# Patient Record
Sex: FEMALE | Race: BLACK OR AFRICAN AMERICAN | Employment: OTHER | URBAN - METROPOLITAN AREA
[De-identification: names, ages, dates, MRNs, and addresses within clinical notes are randomized per-mention and may not be internally consistent; named-entity substitution may affect disease eponyms.]

---

## 2017-01-18 ENCOUNTER — OFFICE VISIT (OUTPATIENT)
Dept: INTERNAL MEDICINE CLINIC | Age: 67
End: 2017-01-18

## 2017-01-18 VITALS
HEIGHT: 60 IN | BODY MASS INDEX: 30.47 KG/M2 | TEMPERATURE: 98.1 F | RESPIRATION RATE: 18 BRPM | HEART RATE: 72 BPM | OXYGEN SATURATION: 96 % | WEIGHT: 155.2 LBS | SYSTOLIC BLOOD PRESSURE: 100 MMHG | DIASTOLIC BLOOD PRESSURE: 60 MMHG

## 2017-01-18 DIAGNOSIS — J34.89 RHINORRHEA: ICD-10-CM

## 2017-01-18 DIAGNOSIS — G47.30 UNSPECIFIED SLEEP APNEA: ICD-10-CM

## 2017-01-18 DIAGNOSIS — M25.552 LEFT HIP PAIN: ICD-10-CM

## 2017-01-18 DIAGNOSIS — R41.3 MEMORY LOSS: ICD-10-CM

## 2017-01-18 DIAGNOSIS — I10 ESSENTIAL HYPERTENSION: ICD-10-CM

## 2017-01-18 DIAGNOSIS — E78.2 MIXED HYPERLIPIDEMIA: Primary | ICD-10-CM

## 2017-01-18 RX ORDER — IPRATROPIUM BROMIDE 21 UG/1
2 SPRAY, METERED NASAL EVERY 12 HOURS
Qty: 30 ML | Refills: 11 | Status: SHIPPED | OUTPATIENT
Start: 2017-01-18 | End: 2018-01-24 | Stop reason: ALTCHOICE

## 2017-01-18 NOTE — PROGRESS NOTES
HISTORY OF PRESENT ILLNESS  Raquel Castro is a 73 y.o. female. HPI Thais Clark is seen today accompanied by her  Maranda Card for follow up of hypertension and other problems. 1. Essential hypertension. Stable on current regimen. 2. Osteoarthritis, primary, bilateral, involving knees. Symptoms stable. It turns out she is having some hip pain on the left. Will refer for an ortho consultation. 3. Mixed hyperlipidemia. Due for routine labs. 4. Memory loss, obstructive sleep apnea. Up to date with specialist.  She was intolerant of CPAP and is followed clinically at this time. 5. Preventive medicine. A Medicare Wellness Visit in six months. Review of systems notable for left hip pain and rhinorrhea. MedDATA/gwo     Current Outpatient Prescriptions   Medication Sig    ipratropium (ATROVENT) 0.03 % nasal spray 2 Sprays by Both Nostrils route every twelve (12) hours.  nabumetone (RELAFEN) 500 mg tablet Take 1 Tab by mouth two (2) times daily as needed for Pain.  memantine (NAMENDA) 10 mg tablet Take  by mouth two (2) times a day.  hydrochlorothiazide (HYDRODIURIL) 25 mg tablet TAKE 1 TABLET DAILY    simvastatin (ZOCOR) 20 mg tablet TAKE 1 TABLET NIGHTLY    metoprolol (LOPRESSOR) 100 mg tablet TAKE 1 TABLET TWICE A DAY    valsartan (DIOVAN) 80 mg tablet TAKE 1 TABLET DAILY    rivastigmine (EXELON) 9.5 mg/24 hr patch 1 Patch by TransDERmal route daily. Per neurology    KLOR-CON M20 20 mEq tablet TAKE 1 TABLET DAILY    DOCOSAHEXANOIC ACID/EPA (FISH OIL PO) Take  by mouth.  cholecalciferol (VITAMIN D3) 1,000 unit tablet Take  by mouth daily.  garlic 2,894 mg Cap Take 1 Cap by mouth daily. No current facility-administered medications for this visit. Review of Systems   Constitutional: Negative for weight loss. HENT: Positive for congestion. Respiratory: Negative. Cardiovascular: Negative for chest pain, palpitations, leg swelling and PND.    Musculoskeletal: Positive for joint pain. Negative for myalgias. Neurological: Negative for focal weakness. Psychiatric/Behavioral: Positive for memory loss. Physical Exam   Constitutional: She appears well-nourished. Neck: Carotid bruit is not present. Cardiovascular: Normal rate and regular rhythm. Exam reveals no gallop and no friction rub. No murmur heard. Pulmonary/Chest: Effort normal and breath sounds normal. No respiratory distress. Musculoskeletal: She exhibits no edema. Nursing note and vitals reviewed. ASSESSMENT and PLAN  Mariel Marc was seen today for medication evaluation. Diagnoses and all orders for this visit:    Mixed hyperlipidemia  -     LIPID PANEL  -     METABOLIC PANEL, COMPREHENSIVE  -     CBC WITH AUTOMATED DIFF    Memory loss- See neurologist as directed     Unspecified sleep apnea- Follow off treatment     Essential hypertension- Continue current regimen of prescription and / or OTC medications     Left hip pain  -     REFERRAL TO ORTHOPEDICS    Rhinorrhea  -     ipratropium (ATROVENT) 0.03 % nasal spray; 2 Sprays by Both Nostrils route every twelve (12) hours. This has been fully explained to the patient, who indicates understanding.

## 2017-01-18 NOTE — MR AVS SNAPSHOT
Visit Information Date & Time Provider Department Dept. Phone Encounter #  
 1/18/2017 10:20 AM Bassam Alfaro MD [facility] [phone] [id number] Upcoming Health Maintenance Date Due Pneumococcal 65+ Low/Medium Risk (2 of 2 - PCV13) 7/18/2017 MEDICARE YEARLY EXAM 7/19/2017 GLAUCOMA SCREENING Q2Y 2/9/2018 BREAST CANCER SCRN MAMMOGRAM 8/3/2018 DTaP/Tdap/Td series (2 - Td) 6/13/2021 COLONOSCOPY 6/17/2021 Allergies as of 1/18/2017  Review Complete On: 1/18/2017 By: Bassam Alfaro MD  
  
 Severity Noted Reaction Type Reactions Adhesive Tape  08/25/2009    Rash Motrin [Ibuprofen]  08/25/2009    Other (comments) Joint stiffness Percocet [Oxycodone-acetaminophen]  08/25/2009    Hives Current Immunizations  Reviewed on 1/17/2017 Name Date Influenza High Dose Vaccine PF 10/26/2016 Pneumococcal Polysaccharide (PPSV-23) 7/18/2016 TDAP Vaccine 6/13/2011  3:16 PM  
 Varicella Virus Vaccine Live 11/1/2011 Zoster Vaccine, Live 11/1/2011 Not reviewed this visit You Were Diagnosed With   
  
 Codes Comments Mixed hyperlipidemia    -  Primary ICD-10-CM: E34.1 ICD-9-CM: 272.2 Memory loss     ICD-10-CM: R41.3 ICD-9-CM: 780.93 Unspecified sleep apnea     ICD-10-CM: G47.30 ICD-9-CM: 780.57 Essential hypertension     ICD-10-CM: I10 
ICD-9-CM: 401.9 Left hip pain     ICD-10-CM: M25.552 ICD-9-CM: 719.45 Rhinorrhea     ICD-10-CM: J34.89 ICD-9-CM: 478.19 Vitals BP Pulse Temp Resp Height(growth percentile) Weight(growth percentile) 100/60 (BP 1 Location: Right arm, BP Patient Position: Sitting) 72 98.1 °F (36.7 °C) (Oral) 18 5' (1.524 m) 155 lb 3.2 oz (70.4 kg) SpO2 BMI OB Status Smoking Status 96% 30.31 kg/m2 Hysterectomy Never Smoker BMI and BSA Data Body Mass Index Body Surface Area  
 30.31 kg/m 2 1.73 m 2 Preferred Pharmacy Pharmacy Name Phone [address] [place] [address] Jennifer Curtis [phone] Your Updated Medication List  
  
   
This list is accurate as of: 17 11:09 AM.  Always use your most recent med list.  
  
  
  
  
 FISH OIL PO Take  by mouth.  
  
 garlic 0,799 mg Cap Take 1 Cap by mouth daily. hydroCHLOROthiazide 25 mg tablet Commonly known as:  HYDRODIURIL  
TAKE 1 TABLET DAILY  
  
 ipratropium 0.03 % nasal spray Commonly known as:  ATROVENT  
2 Sprays by Both Nostrils route every twelve (12) hours. KLOR-CON M20 20 mEq tablet Generic drug:  potassium chloride TAKE 1 TABLET DAILY  
  
 memantine 10 mg tablet Commonly known as:  Allison Robles Take  by mouth two (2) times a day. metoprolol tartrate 100 mg IR tablet Commonly known as:  LOPRESSOR  
TAKE 1 TABLET TWICE A DAY  
  
 nabumetone 500 mg tablet Commonly known as:  RELAFEN Take 1 Tab by mouth two (2) times daily as needed for Pain.  
  
 rivastigmine 9.5 mg/24 hr patch Commonly known as:  EXELON  
1 Patch by TransDERmal route daily. Per neurology  
  
 simvastatin 20 mg tablet Commonly known as:  ZOCOR  
TAKE 1 TABLET NIGHTLY  
  
 valsartan 80 mg tablet Commonly known as:  DIOVAN  
TAKE 1 TABLET DAILY  
  
 VITAMIN D3 1,000 unit tablet Generic drug:  cholecalciferol Take  by mouth daily. Prescriptions Sent to Pharmacy Refills  
 ipratropium (ATROVENT) 0.03 % nasal spray 11 Si Sprays by Both Nostrils route every twelve (12) hours. Class: Normal  
 Pharmacy: [address] [place] [address] [address]  #: [phone] Route: Both Nostrils We Performed the Following CBC WITH AUTOMATED DIFF [22523 CPT(R)] LIPID PANEL [28884 CPT(R)] METABOLIC PANEL, COMPREHENSIVE [28592 CPT(R)] REFERRAL TO ORTHOPEDICS [FIJ183 Custom] Referral Information Referral ID Referred By Referred To [id number] Andrea ROSEN MD   
   [facility] Phone: [phone] Fax: [phone] Visits Status Start Date End Date 1 New Request 1/18/17 1/18/18 If your referral has a status of pending review or denied, additional information will be sent to support the outcome of this decision. Introducing [facility]! Dear Ashley Sánchez: Thank you for requesting a [employer] account. Our records indicate that you already have an active Going My Way account. You can access your account anytime at [email or web address] Did you know that you can access your hospital and ER discharge instructions at any time in Going My Way? You can also review all of your test results from your hospital stay or ER visit. Additional Information If you have questions, please visit the Frequently Asked Questions section of the Going My Way website at [email or web address] Remember, Going My Way is NOT to be used for urgent needs. For medical emergencies, dial 911. Now available from your iPhone and Android! Please provide this summary of care documentation to your next provider. Your primary care clinician is listed as MAHSA ROSEN. If you have any questions after today's visit, please call [phone].

## 2017-01-31 ENCOUNTER — HOSPITAL ENCOUNTER (OUTPATIENT)
Dept: LAB | Age: 67
Discharge: HOME | End: 2017-01-31
Payer: MEDICARE

## 2017-01-31 PROCEDURE — 36415 COLL VENOUS BLD VENIPUNCTURE: CPT

## 2017-01-31 PROCEDURE — 80053 COMPREHEN METABOLIC PANEL: CPT

## 2017-01-31 PROCEDURE — 85025 COMPLETE CBC W/AUTO DIFF WBC: CPT

## 2017-01-31 PROCEDURE — 80061 LIPID PANEL: CPT

## 2017-02-01 LAB
ALBUMIN SERPL-MCNC: 4.8 G/DL (ref 3.6–4.8)
ALBUMIN/GLOB SERPL: 2.2 {RATIO} (ref 1.1–2.5)
ALP SERPL-CCNC: 61 IU/L (ref 39–117)
ALT SERPL-CCNC: 20 IU/L (ref 0–32)
AST SERPL-CCNC: 27 IU/L (ref 0–40)
BASOPHILS # BLD AUTO: 0 X10E3/UL (ref 0–0.2)
BASOPHILS NFR BLD AUTO: 1 %
BILIRUB SERPL-MCNC: 0.3 MG/DL (ref 0–1.2)
BUN SERPL-MCNC: 31 MG/DL (ref 8–27)
BUN/CREAT SERPL: 20 (ref 11–26)
CALCIUM SERPL-MCNC: 9.7 MG/DL (ref 8.7–10.3)
CHLORIDE SERPL-SCNC: 101 MMOL/L (ref 96–106)
CHOLEST SERPL-MCNC: 214 MG/DL (ref 100–199)
CO2 SERPL-SCNC: 25 MMOL/L (ref 18–29)
CREAT SERPL-MCNC: 1.54 MG/DL (ref 0.57–1)
EOSINOPHIL # BLD AUTO: 0.4 X10E3/UL (ref 0–0.4)
EOSINOPHIL NFR BLD AUTO: 5 %
ERYTHROCYTE [DISTWIDTH] IN BLOOD BY AUTOMATED COUNT: 13.4 % (ref 12.3–15.4)
GLOBULIN SER CALC-MCNC: 2.2 G/DL (ref 1.5–4.5)
GLUCOSE SERPL-MCNC: 87 MG/DL (ref 65–99)
HCT VFR BLD AUTO: 38.3 % (ref 34–46.6)
HDLC SERPL-MCNC: 139 MG/DL
HGB BLD-MCNC: 12.9 G/DL (ref 11.1–15.9)
IMM GRANULOCYTES # BLD: 0 X10E3/UL (ref 0–0.1)
IMM GRANULOCYTES NFR BLD: 0 %
LDLC SERPL CALC-MCNC: 61 MG/DL (ref 0–99)
LYMPHOCYTES # BLD AUTO: 2.5 X10E3/UL (ref 0.7–3.1)
LYMPHOCYTES NFR BLD AUTO: 34 %
MCH RBC QN AUTO: 32.5 PG (ref 26.6–33)
MCHC RBC AUTO-ENTMCNC: 33.7 G/DL (ref 31.5–35.7)
MCV RBC AUTO: 97 FL (ref 79–97)
MONOCYTES # BLD AUTO: 0.7 X10E3/UL (ref 0.1–0.9)
MONOCYTES NFR BLD AUTO: 10 %
NEUTROPHILS # BLD AUTO: 3.7 X10E3/UL (ref 1.4–7)
NEUTROPHILS NFR BLD AUTO: 50 %
PLATELET # BLD AUTO: 164 X10E3/UL (ref 150–379)
POTASSIUM SERPL-SCNC: 4 MMOL/L (ref 3.5–5.2)
PROT SERPL-MCNC: 7 G/DL (ref 6–8.5)
RBC # BLD AUTO: 3.97 X10E6/UL (ref 3.77–5.28)
SODIUM SERPL-SCNC: 142 MMOL/L (ref 134–144)
TRIGL SERPL-MCNC: 70 MG/DL (ref 0–149)
VLDLC SERPL CALC-MCNC: 14 MG/DL (ref 5–40)
WBC # BLD AUTO: 7.3 X10E3/UL (ref 3.4–10.8)

## 2017-02-04 RX ORDER — POTASSIUM CHLORIDE 20 MEQ/1
TABLET, EXTENDED RELEASE ORAL
Qty: 90 TAB | Refills: 3 | Status: SHIPPED | OUTPATIENT
Start: 2017-02-04 | End: 2018-03-29 | Stop reason: SDUPTHER

## 2017-03-25 DIAGNOSIS — E78.2 MIXED HYPERLIPIDEMIA: ICD-10-CM

## 2017-03-26 RX ORDER — SIMVASTATIN 20 MG/1
TABLET, FILM COATED ORAL
Qty: 90 TAB | Refills: 1 | Status: SHIPPED | COMMUNITY
Start: 2017-03-26 | End: 2017-03-27 | Stop reason: SDUPTHER

## 2017-03-27 DIAGNOSIS — E78.2 MIXED HYPERLIPIDEMIA: ICD-10-CM

## 2017-03-27 RX ORDER — SIMVASTATIN 20 MG/1
TABLET, FILM COATED ORAL
Qty: 90 TAB | Refills: 3 | Status: SHIPPED | COMMUNITY
Start: 2017-03-27 | End: 2018-07-09 | Stop reason: SDUPTHER

## 2017-04-24 DIAGNOSIS — I10 ESSENTIAL HYPERTENSION: ICD-10-CM

## 2017-04-24 RX ORDER — HYDROCHLOROTHIAZIDE 25 MG/1
TABLET ORAL
Qty: 90 TAB | Refills: 3 | Status: SHIPPED | COMMUNITY
Start: 2017-04-24 | End: 2017-07-24 | Stop reason: SDUPTHER

## 2017-04-24 RX ORDER — HYDROCHLOROTHIAZIDE 25 MG/1
TABLET ORAL
Qty: 90 TAB | Refills: 3 | Status: SHIPPED | COMMUNITY
Start: 2017-04-24 | End: 2019-12-12 | Stop reason: SDUPTHER

## 2017-06-23 DIAGNOSIS — I10 ESSENTIAL HYPERTENSION: ICD-10-CM

## 2017-06-24 RX ORDER — METOPROLOL TARTRATE 100 MG/1
TABLET ORAL
Qty: 180 TAB | Refills: 1 | Status: SHIPPED | OUTPATIENT
Start: 2017-06-24 | End: 2018-02-21 | Stop reason: SDUPTHER

## 2017-07-24 ENCOUNTER — TELEPHONE (OUTPATIENT)
Dept: INTERNAL MEDICINE CLINIC | Age: 67
End: 2017-07-24

## 2017-07-24 ENCOUNTER — OFFICE VISIT (OUTPATIENT)
Dept: INTERNAL MEDICINE CLINIC | Age: 67
End: 2017-07-24

## 2017-07-24 VITALS
HEART RATE: 64 BPM | SYSTOLIC BLOOD PRESSURE: 130 MMHG | HEIGHT: 60 IN | RESPIRATION RATE: 18 BRPM | TEMPERATURE: 98.2 F | OXYGEN SATURATION: 98 % | DIASTOLIC BLOOD PRESSURE: 72 MMHG | BODY MASS INDEX: 29.29 KG/M2 | WEIGHT: 149.2 LBS

## 2017-07-24 DIAGNOSIS — Z13.31 SCREENING FOR DEPRESSION: ICD-10-CM

## 2017-07-24 DIAGNOSIS — R41.3 MEMORY LOSS: ICD-10-CM

## 2017-07-24 DIAGNOSIS — E78.2 MIXED HYPERLIPIDEMIA: ICD-10-CM

## 2017-07-24 DIAGNOSIS — Z00.00 MEDICARE ANNUAL WELLNESS VISIT, SUBSEQUENT: Primary | ICD-10-CM

## 2017-07-24 DIAGNOSIS — Z23 ENCOUNTER FOR IMMUNIZATION: ICD-10-CM

## 2017-07-24 DIAGNOSIS — I10 ESSENTIAL HYPERTENSION: ICD-10-CM

## 2017-07-24 DIAGNOSIS — Z13.39 SCREENING FOR ALCOHOLISM: ICD-10-CM

## 2017-07-24 DIAGNOSIS — M19.90 ARTHRITIS: ICD-10-CM

## 2017-07-24 NOTE — TELEPHONE ENCOUNTER
Called and spoke to [place] with Dr. Fifi Pineda office- she states pts last colonoscopy was 10/2016 and pt is due to f/u in 10 yrs. She is faxing over a copy of the report.

## 2017-07-24 NOTE — PATIENT INSTRUCTIONS
As discussed in your appointment today, [address] is an important part of planning for your healthcare future. Discussing your preferences with your family and your care team is a part of good healthcare so that we can be guided by your known values and goals. Our office offers this service at no cost to you. Our Nurse Navigators and certified [employer] Cho[employer] ® Facilitators, Krys Fitzpatrick and Sanju Barrios typically schedule family appointments for this service on Wednesdays. To schedule an Advance Care Planning visit or to receive more information about this service, please call [employer] [facility] at [phone] and ask to speak directly to [employer] or [employer]. Medicare Part B Preventive Services Limitations Recommendation Scheduled   Bone Mass Measurement  (age 72 & older, biennial) Requires diagnosis related to osteoporosis or estrogen deficiency. Biennial benefit unless patient has history of long-term glucocorticoid tx or baseline is needed because initial test was by other method     Cardiovascular Screening Blood Tests (every 5 years)  Total cholesterol, HDL, Triglycerides Order as a panel if possible     Colorectal Cancer Screening  -Fecal occult blood test (annual)  -Flexible sigmoidoscopy (5y)  -Screening colonoscopy (10y)  -Barium Enema      Counseling to Prevent Tobacco Use (up to 8 sessions per year)  - Counseling greater than 3 and up to 10 minutes  - Counseling greater than 10 minutes Patients must be asymptomatic of tobacco-related conditions to receive as preventive service     Diabetes Screening Tests (at least every 3 years, Medicare covers annually or at 6-month intervals for prediabetic patients)    Fasting blood sugar (FBS) or glucose tolerance test (GTT) Patient must be diagnosed with one of the following:  -Hypertension, Dyslipidemia, obesity, previous impaired FBS or GTT  Or any two of the following: overweight, FH of diabetes, age ? 72, history of gestational diabetes, birth of baby weighing more than 9 pounds     Diabetes Self-Management Training (DSMT) (no USPSTF recommendation) Requires referral by treating physician for patient with diabetes or renal disease. 10 hours of initial DSMT session of no less than 30 minutes each in a continuous 12-month period. 2 hours of follow-up DSMT in subsequent years. Glaucoma Screening (no USPSTF recommendation) Diabetes mellitus, family history, , age 48 or over,  American, age 72 or over     Human Immunodeficiency Virus (HIV) Screening (annually for increased risk patients)  HIV-1 and HIV-2 by EIA, JANNET, rapid antibody test, or oral mucosa transudate Patient must be at increased risk for HIV infection per USPSTF guidelines or pregnant. Tests covered annually for patients at increased risk. Pregnant patients may receive up to 3 test during pregnancy. Medical Nutrition Therapy (MNT) (for diabetes or renal disease not recommended schedule) Requires referral by treating physician for patient with diabetes or renal disease. Can be provided in same year as diabetes self-management training (DSMT), and CMS recommends medical nutrition therapy take place after DSMT. Up to 3 hours for initial year and 2 hours in subsequent years. Shingles Vaccination A shingles vaccine is also recommended once in a lifetime after age 61     Seasonal Influenza Vaccination (annually)      Pneumococcal Vaccination (once after 72)      Hepatitis B Vaccinations (if medium/high risk) Medium/high risk factors:  End-stage renal disease,  Hemophiliacs who received Factor VIII or IX concentrates, Clients of institutions for the mentally retarded, Persons who live in the same house as a HepB virus carrier, Homosexual men, Illicit injectable drug abusers.      Screening Mammography (biennial age 54-69) Annually (age 36 or over)     Screening Pap Tests and Pelvic Examination (up to age 79 and after 79 if unknown history or abnormal study last 10 years) Every 24 months except high risk     Ultrasound Screening for Abdominal Aortic Aneurysm (AAA) (once) Patient must be referred through IPPE and not have had a screening for abdominal aortic aneurysm before under Medicare.   Limited to patients who meet one of the following criteria:  - Men who are 73-68 years old and have smoked more than 100 cigarettes in their lifetime.  -Anyone with a FH of AAA  -Anyone recommended for screening by USPSTF

## 2017-07-24 NOTE — PROGRESS NOTES
HISTORY OF PRESENT ILLNESS  Maki Rhoades is a 78 y.o. female. HPI Jimmy Rodriguez is seen today for a complete physical examination, Medicare Wellness Visit and follow up of chronic problems. Preventive medicine. Fully reviewed today. She is due for a complete physical examination and routine screening laboratory studies. Also, due for Prevnar vaccine. She is up to date with other vaccinations, gyn care, mammogram, bone density study and a baseline EKG. Medicare Wellness Visit. See attached note. Chronic medical problems are reviewed. 1. Hypertension. Stable on current regimen. 2. Osteoarthritis, multiple locations. Clinically stable. 3. Hyperlipidemia. Due for routine labs. 4. Mild cognitive impairment. Up to date with neurology follow up. No obvious significant progression. Triond Player did not have to contribute to her history in any way. MedDATA/gwo       Current Outpatient Prescriptions   Medication Sig    metoprolol tartrate (LOPRESSOR) 100 mg IR tablet TAKE 1 TABLET BY MOUTH TWICE DAILY    hydroCHLOROthiazide (HYDRODIURIL) 25 mg tablet TAKE 1 TABLET DAILY    simvastatin (ZOCOR) 20 mg tablet TAKE 1 TABLET BY MOUTH NIGHTLY    potassium chloride (K-DUR, KLOR-CON) 20 mEq tablet TAKE 1 TABLET DAILY    ipratropium (ATROVENT) 0.03 % nasal spray 2 Sprays by Both Nostrils route every twelve (12) hours.  nabumetone (RELAFEN) 500 mg tablet Take 1 Tab by mouth two (2) times daily as needed for Pain.  memantine (NAMENDA) 10 mg tablet Take  by mouth two (2) times a day.  valsartan (DIOVAN) 80 mg tablet TAKE 1 TABLET DAILY    rivastigmine (EXELON) 9.5 mg/24 hr patch 1 Patch by TransDERmal route daily. Per neurology    DOCOSAHEXANOIC ACID/EPA (FISH OIL PO) Take  by mouth.  cholecalciferol (VITAMIN D3) 1,000 unit tablet Take  by mouth daily.  garlic 0,209 mg Cap Take 1 Cap by mouth daily. No current facility-administered medications for this visit.           Review of Systems Constitutional: Negative for weight loss. HENT: Negative for hearing loss. Respiratory: Negative. Cardiovascular: Negative for chest pain, palpitations, leg swelling and PND. Gastrointestinal: Negative. Genitourinary: Negative. Musculoskeletal: Positive for joint pain. Negative for myalgias. Neurological: Negative for focal weakness. Psychiatric/Behavioral: Positive for memory loss. Physical Exam   Constitutional: She is oriented to person, place, and time. She appears well-developed and well-nourished. No distress. HENT:   Head: Normocephalic and atraumatic. Right Ear: Tympanic membrane, external ear and ear canal normal.   Left Ear: Tympanic membrane, external ear and ear canal normal.   Eyes: EOM are normal. Pupils are equal, round, and reactive to light. Right eye exhibits no discharge. Left eye exhibits no discharge. Neck: Normal range of motion. Neck supple. Carotid bruit is not present. No thyromegaly present. Cardiovascular: Normal rate, regular rhythm, normal heart sounds and intact distal pulses. Exam reveals no gallop and no friction rub. No murmur heard. Pulmonary/Chest: Effort normal and breath sounds normal. No respiratory distress. She has no wheezes. She has no rales. Abdominal: Soft. Bowel sounds are normal. She exhibits no distension and no mass. There is no tenderness. There is no rebound and no guarding. Musculoskeletal: Normal range of motion. She exhibits no edema or tenderness. Lymphadenopathy:     She has no cervical adenopathy. Neurological: She is alert and oriented to person, place, and time. Reflex Scores:       Patellar reflexes are 1+ on the right side and 1+ on the left side. Skin: Skin is warm and dry. No rash noted. Psychiatric: She has a normal mood and affect. Her behavior is normal.   Nursing note and vitals reviewed. ASSESSMENT and PLAN  Diagnoses and all orders for this visit:    1.  Medicare annual wellness visit, subsequent    2. Encounter for immunization  -     pneumococcal 13 hayes conj dip (PREVNAR-13) 0.5 mL syrg injection; 0.5 mL by IntraMUSCular route once for 1 dose. 3. Essential hypertension  -     COMP METABOLIC PANEL  -     URINALYSIS W/ REFLEX MICRO    4. Arthritis- Continue current regimen of prescription and / or OTC medications     5. Memory loss- See neurologist as directed     6. Mixed hyperlipidemia  -     COMP METABOLIC PANEL  -     CBC W/ AUTOMATED DIFF  -     LIPID PANEL  -     TSH    7. Screening for alcoholism    8. Screening for depression  -     Depression Screen Annual      This is a Subsequent Medicare Annual Wellness Visit providing Personalized Prevention Plan Services (PPPS) (Performed 12 months after initial AWV and PPPS )    I have reviewed the patient's medical history in detail and updated the computerized patient record. History     Past Medical History:   Diagnosis Date    Arthritis     KNEE PAIN    Diverticulosis     Hypercholesteremia     Hypertension     Unspecified sleep apnea     Vitamin B 12 deficiency     Zoster       Past Surgical History:   Procedure Laterality Date    ENDOSCOPY, COLON, DIAGNOSTIC      ? due- 12    HX HYSTERECTOMY      HX ORTHOPAEDIC  '07    RT. SHOULDER    HX ORTHOPAEDIC  6/08    RT. TKR     Current Outpatient Prescriptions   Medication Sig Dispense Refill    metoprolol tartrate (LOPRESSOR) 100 mg IR tablet TAKE 1 TABLET BY MOUTH TWICE DAILY 180 Tab 1    hydroCHLOROthiazide (HYDRODIURIL) 25 mg tablet TAKE 1 TABLET DAILY 90 Tab 3    simvastatin (ZOCOR) 20 mg tablet TAKE 1 TABLET BY MOUTH NIGHTLY 90 Tab 3    potassium chloride (K-DUR, KLOR-CON) 20 mEq tablet TAKE 1 TABLET DAILY 90 Tab 3    ipratropium (ATROVENT) 0.03 % nasal spray 2 Sprays by Both Nostrils route every twelve (12) hours. 30 mL 11    nabumetone (RELAFEN) 500 mg tablet Take 1 Tab by mouth two (2) times daily as needed for Pain.  180 Tab 3    memantine (NAMENDA) 10 mg tablet Take by mouth two (2) times a day.  valsartan (DIOVAN) 80 mg tablet TAKE 1 TABLET DAILY 90 Tab 3    rivastigmine (EXELON) 9.5 mg/24 hr patch 1 Patch by TransDERmal route daily. Per neurology 1 Patch 0    DOCOSAHEXANOIC ACID/EPA (FISH OIL PO) Take  by mouth.  cholecalciferol (VITAMIN D3) 1,000 unit tablet Take  by mouth daily.  garlic 2,928 mg Cap Take 1 Cap by mouth daily. Allergies   Allergen Reactions    Adhesive Tape Rash    Motrin [Ibuprofen] Other (comments)     Joint stiffness    Percocet [Oxycodone-Acetaminophen] Hives     Family History   Problem Relation Age of Onset    Hypertension Mother     Stroke Mother     Diabetes Father     Hypertension Father     Alcohol abuse Neg Hx     Arthritis-osteo Neg Hx     Asthma Neg Hx     Bleeding Prob Neg Hx     Cancer Neg Hx     Elevated Lipids Neg Hx     Headache Neg Hx     Heart Disease Neg Hx     Lung Disease Neg Hx     Migraines Neg Hx     Psychiatric Disorder Neg Hx     Mental Retardation Neg Hx      Social History   Substance Use Topics    Smoking status: Never Smoker    Smokeless tobacco: Never Used    Alcohol use 0.0 oz/week     0 Standard drinks or equivalent per week      Comment: 1 PER DAY rare     Patient Active Problem List   Diagnosis Code    Arthritis M19.90    Hypertension I10    Herpes zoster without mention of complication Q39.4    Diverticulosis of colon (without mention of hemorrhage) K57.30    Memory loss R41.3    Unspecified sleep apnea G47.30    Encounter for long-term (current) use of other medications Z79.899    Mixed hyperlipidemia E78.2    Advance directive discussed with patient Z71.89       Depression Risk Factor Screening:     PHQ over the last two weeks 7/24/2017   Little interest or pleasure in doing things Not at all   Feeling down, depressed or hopeless Not at all   Total Score PHQ 2 0     Alcohol Risk Factor Screening:    On any occasion during the past 3 months, have you had more than 3 drinks containing alcohol? No    Do you average more than 7 drinks per week? No        Functional Ability and Level of Safety:     Hearing Loss   normal-to-mild    Activities of Daily Living   Self-care. Requires assistance with: no ADLs    Fall Risk   Fall Risk Assessment, last 12 mths 7/24/2017   Able to walk? Yes   Fall in past 12 months? No     Abuse Screen   Patient is not abused    Review of Systems   A comprehensive review of systems was negative except for that written in the HPI. Physical Examination     Evaluation of Cognitive Function:  Mood/affect:  neutral  Appearance: age appropriate  Family member/caregiver input:  Re [place] available but doesn't have to give much history    No exam performed today, See attached exam documentation . Patient Care Team:  Sher Vazquez MD as PCP - General    Advice/Referrals/Counseling   Education and counseling provided:  Are appropriate based on today's review and evaluation      Assessment/Plan   Diagnoses and all orders for this visit:    1. Medicare annual wellness visit, subsequent    2. Encounter for immunization  -     pneumococcal 13 hayes conj dip (PREVNAR-13) 0.5 mL syrg injection; 0.5 mL by IntraMUSCular route once for 1 dose. 3. Essential hypertension  -     COMP METABOLIC PANEL  -     URINALYSIS W/ REFLEX MICRO    4. Arthritis    5. Memory loss    6. Mixed hyperlipidemia  -     COMP METABOLIC PANEL  -     CBC W/ AUTOMATED DIFF  -     LIPID PANEL  -     TSH    7. Screening for alcoholism    8.  Screening for depression  -     Depression Screen Annual    .

## 2017-07-24 NOTE — MR AVS SNAPSHOT
Visit Information Date & Time Provider Department Dept. Phone Encounter #  
 7/24/2017  2:00 PM Sara Wyatt, [facility] [phone] [id number] Follow-up Instructions Return in about 6 months (around 1/24/2018). Upcoming Health Maintenance Date Due Pneumococcal 65+ Low/Medium Risk (2 of 2 - PCV13) 7/18/2017 INFLUENZA AGE 9 TO ADULT 8/1/2017 GLAUCOMA SCREENING Q2Y 2/9/2018 MEDICARE YEARLY EXAM 7/25/2018 BREAST CANCER SCRN MAMMOGRAM 8/3/2018 DTaP/Tdap/Td series (2 - Td) 6/13/2021 COLONOSCOPY 6/17/2021 Allergies as of 7/24/2017  Review Complete On: 7/24/2017 By: Sara Wyatt MD  
  
 Severity Noted Reaction Type Reactions Adhesive Tape  08/25/2009    Rash Motrin [Ibuprofen]  08/25/2009    Other (comments) Joint stiffness Percocet [Oxycodone-acetaminophen]  08/25/2009    Hives Current Immunizations  Reviewed on 7/24/2017 Name Date Influenza High Dose Vaccine PF 10/26/2016 Pneumococcal Polysaccharide (PPSV-23) 7/18/2016 TDAP Vaccine 6/13/2011  3:16 PM  
 Varicella Virus Vaccine Live 11/1/2011 Zoster Vaccine, Live 11/1/2011 Reviewed by Sara Wyatt MD on 7/24/2017 at  2:18 PM  
You Were Diagnosed With   
  
 Codes Comments Medicare annual wellness visit, subsequent    -  Primary ICD-10-CM: Z00.00 ICD-9-CM: V70.0 Encounter for immunization     ICD-10-CM: P67 ICD-9-CM: V03.89 Essential hypertension     ICD-10-CM: I10 
ICD-9-CM: 401.9 Arthritis     ICD-10-CM: M19.90 ICD-9-CM: 716.90 Memory loss     ICD-10-CM: R41.3 ICD-9-CM: 780.93 Mixed hyperlipidemia     ICD-10-CM: E78.2 ICD-9-CM: 272.2 Screening for alcoholism     ICD-10-CM: Z13.89 ICD-9-CM: V79.1 Screening for depression     ICD-10-CM: Z13.89 ICD-9-CM: V79.0 Vitals BP Pulse Temp Resp Height(growth percentile) Weight(growth percentile) 130/72 (BP 1 Location: Right arm, BP Patient Position: Sitting) 64 98.2 °F (36.8 °C) (Oral) 18 5' (1.524 m) 149 lb 3.2 oz (67.7 kg) SpO2 BMI OB Status Smoking Status 98% 29.14 kg/m2 Hysterectomy Never Smoker BMI and BSA Data Body Mass Index Body Surface Area  
 29.14 kg/m 2 1.69 m 2 Preferred Pharmacy Pharmacy Name Phone 99 [facility] Jennifer Curtis [phone] Your Updated Medication List  
  
   
This list is accurate as of: 7/24/17  2:30 PM.  Always use your most recent med list.  
  
  
  
  
 FISH OIL PO Take  by mouth.  
  
 garlic 0,412 mg Cap Take 1 Cap by mouth daily. hydroCHLOROthiazide 25 mg tablet Commonly known as:  HYDRODIURIL  
TAKE 1 TABLET DAILY  
  
 ipratropium 0.03 % nasal spray Commonly known as:  ATROVENT  
2 Sprays by Both Nostrils route every twelve (12) hours. memantine 10 mg tablet Commonly known as:  Elsworth Lambert Take  by mouth two (2) times a day. metoprolol tartrate 100 mg IR tablet Commonly known as:  LOPRESSOR  
TAKE 1 TABLET BY MOUTH TWICE DAILY  
  
 nabumetone 500 mg tablet Commonly known as:  RELAFEN Take 1 Tab by mouth two (2) times daily as needed for Pain. pneumococcal 13 hayes conj dip 0.5 mL Syrg injection Commonly known as:  PREVNAR-13  
0.5 mL by IntraMUSCular route once for 1 dose. potassium chloride 20 mEq tablet Commonly known as:  K-DUR, KLOR-CON  
TAKE 1 TABLET DAILY  
  
 rivastigmine 9.5 mg/24 hr patch Commonly known as:  EXELON  
1 Patch by TransDERmal route daily. Per neurology  
  
 simvastatin 20 mg tablet Commonly known as:  ZOCOR  
TAKE 1 TABLET BY MOUTH NIGHTLY  
  
 valsartan 80 mg tablet Commonly known as:  DIOVAN  
TAKE 1 TABLET DAILY  
  
 VITAMIN D3 1,000 unit tablet Generic drug:  cholecalciferol Take  by mouth daily. Prescriptions Printed Refills pneumococcal 13 hayes conj dip (PREVNAR-13) 0.5 mL syrg injection 0 Si.5 mL by IntraMUSCular route once for 1 dose. Class: Print Route: IntraMUSCular We Performed the Following CBC WITH AUTOMATED DIFF [14003 CPT(R)] [place] [address] [[id number] HCPCS] LIPID PANEL [14512 CPT(R)] METABOLIC PANEL, COMPREHENSIVE [77074 CPT(R)] TSH 3RD GENERATION [46553 CPT(R)] URINALYSIS W/ RFLX MICROSCOPIC [83313 CPT(R)] Follow-up Instructions Return in about 6 months (around 2018). To-Do List   
 2017 10:30 AM  
(Arrive by 10:15 AM) Appointment with Jocy WOLFE 1 at [address] [place] [address] ([phone]) Shower or bathe using soap and water. Do not use deodorant, powder, perfumes, or lotion the day of your exam.  If your prior mammograms were not performed at [place] [address] please bring films with you or forward prior images 2 days before your procedure. Check in at registration 15min before your appointment time unless you were instructed to do otherwise. A script is not necessary, but if you have one, please bring it on the day of the mammogram or have it faxed to the department. [facility] [phone] [facility]  [phone] [facility]  [phone] [facility] [phone] [address] [phone] Please arrive 15 minutes prior to appointment to register. Patient Instructions As discussed in your appointment today, [address] is an important part of planning for your healthcare future. Discussing your preferences with your family and your care team is a part of good healthcare so that we can be guided by your known values and goals. Our office offers this service at no cost to you. Our Nurse Navigators and certified Respecting Choices ® Facilitators, Nesha Hurtado and Grisel Anderson typically schedule family appointments for this service on .  To schedule an [address] [place] [address] Road visit or to receive more information about this service, please call [facility] at [phone] and ask to speak directly to [place] Melissa or [employer]. Medicare Part B Preventive Services Limitations Recommendation Scheduled Bone Mass Measurement 
(age 72 & older, biennial) Requires diagnosis related to osteoporosis or estrogen deficiency. Biennial benefit unless patient has history of long-term glucocorticoid tx or baseline is needed because initial test was by other method Cardiovascular Screening Blood Tests (every 5 years) Total cholesterol, HDL, Triglycerides Order as a panel if possible Colorectal Cancer Screening 
-Fecal occult blood test (annual) -Flexible sigmoidoscopy (5y) 
-Screening colonoscopy (10y) -Barium Enema Counseling to Prevent Tobacco Use (up to 8 sessions per year) - Counseling greater than 3 and up to 10 minutes - Counseling greater than 10 minutes Patients must be asymptomatic of tobacco-related conditions to receive as preventive service Diabetes Screening Tests (at least every 3 years, Medicare covers annually or at 6-month intervals for prediabetic patients) Fasting blood sugar (FBS) or glucose tolerance test (GTT) Patient must be diagnosed with one of the following: 
-Hypertension, Dyslipidemia, obesity, previous impaired FBS or GTT 
Or any two of the following: overweight, FH of diabetes, age ? 72, history of gestational diabetes, birth of baby weighing more than 9 pounds Diabetes Self-Management Training (DSMT) (no USPSTF recommendation) Requires referral by treating physician for patient with diabetes or renal disease. 10 hours of initial DSMT session of no less than 30 minutes each in a continuous 12-month period. 2 hours of follow-up DSMT in subsequent years.     
Glaucoma Screening (no USPSTF recommendation) Diabetes mellitus, family history, , age 48 or over,  American, age 72 or over Human Immunodeficiency Virus (HIV) Screening (annually for increased risk patients) HIV-1 and HIV-2 by EIA, JANNET, rapid antibody test, or oral mucosa transudate Patient must be at increased risk for HIV infection per USPSTF guidelines or pregnant. Tests covered annually for patients at increased risk. Pregnant patients may receive up to 3 test during pregnancy. Medical Nutrition Therapy (MNT) (for diabetes or renal disease not recommended schedule) Requires referral by treating physician for patient with diabetes or renal disease. Can be provided in same year as diabetes self-management training (DSMT), and CMS recommends medical nutrition therapy take place after DSMT. Up to 3 hours for initial year and 2 hours in subsequent years. Shingles Vaccination A shingles vaccine is also recommended once in a lifetime after age 61 Seasonal Influenza Vaccination (annually) Pneumococcal Vaccination (once after 65) Hepatitis B Vaccinations (if medium/high risk) Medium/high risk factors:  End-stage renal disease, Hemophiliacs who received Factor VIII or IX concentrates, Clients of institutions for the mentally retarded, Persons who live in the same house as a HepB virus carrier, Homosexual men, Illicit injectable drug abusers. Screening Mammography (biennial age 54-69) Annually (age 36 or over) Screening Pap Tests and Pelvic Examination (up to age 79 and after 79 if unknown history or abnormal study last 10 years) Every 24 months except high risk Ultrasound Screening for Abdominal Aortic Aneurysm (AAA) (once) Patient must be referred through IPPE and not have had a screening for abdominal aortic aneurysm before under Medicare. Limited to patients who meet one of the following criteria: 
- Men who are 73-68 years old and have smoked more than 100 cigarettes in their lifetime. 
-Anyone with a FH of AAA -Anyone recommended for screening by USPSTF Introducing [facility]! Dear Farzana Nugent: Thank you for requesting a Zimride account. Our records indicate that you already have an active Zimride account. You can access your account anytime at [email or web address] Did you know that you can access your hospital and ER discharge instructions at any time in Zimride? You can also review all of your test results from your hospital stay or ER visit. Additional Information If you have questions, please visit the Frequently Asked Questions section of the Zimride website at [email or web address] Remember, Zimride is NOT to be used for urgent needs. For medical emergencies, dial 911. Now available from your iPhone and Android! Please provide this summary of care documentation to your next provider. Your primary care clinician is listed as MAHSA ROSEN. If you have any questions after today's visit, please call [phone].

## 2017-07-26 NOTE — TELEPHONE ENCOUNTER
Advised pt's  that we called Dr. James Ferrell office - pts last colonoscopy was 10/2016. She will be due to f/u in 10 years.

## 2017-07-31 ENCOUNTER — HOSPITAL ENCOUNTER (OUTPATIENT)
Dept: LAB | Age: 67
Discharge: HOME | End: 2017-07-31
Payer: MEDICARE

## 2017-07-31 PROCEDURE — 81001 URINALYSIS AUTO W/SCOPE: CPT

## 2017-07-31 PROCEDURE — 80061 LIPID PANEL: CPT

## 2017-07-31 PROCEDURE — 80053 COMPREHEN METABOLIC PANEL: CPT

## 2017-07-31 PROCEDURE — 36415 COLL VENOUS BLD VENIPUNCTURE: CPT

## 2017-07-31 PROCEDURE — 84443 ASSAY THYROID STIM HORMONE: CPT

## 2017-07-31 PROCEDURE — 85025 COMPLETE CBC W/AUTO DIFF WBC: CPT

## 2017-08-01 LAB
ALBUMIN SERPL-MCNC: 4.4 G/DL (ref 3.6–4.8)
ALBUMIN/GLOB SERPL: 1.8 {RATIO} (ref 1.2–2.2)
ALP SERPL-CCNC: 58 IU/L (ref 39–117)
ALT SERPL-CCNC: 18 IU/L (ref 0–32)
APPEARANCE UR: CLEAR
AST SERPL-CCNC: 24 IU/L (ref 0–40)
BACTERIA #/AREA URNS HPF: NORMAL /[HPF]
BASOPHILS # BLD AUTO: 0 X10E3/UL (ref 0–0.2)
BASOPHILS NFR BLD AUTO: 0 %
BILIRUB SERPL-MCNC: 0.2 MG/DL (ref 0–1.2)
BILIRUB UR QL STRIP: NEGATIVE
BUN SERPL-MCNC: 33 MG/DL (ref 8–27)
BUN/CREAT SERPL: 20 (ref 12–28)
CALCIUM SERPL-MCNC: 9.7 MG/DL (ref 8.7–10.3)
CASTS URNS QL MICRO: NORMAL /LPF
CHLORIDE SERPL-SCNC: 100 MMOL/L (ref 96–106)
CHOLEST SERPL-MCNC: 184 MG/DL (ref 100–199)
CO2 SERPL-SCNC: 24 MMOL/L (ref 18–29)
COLOR UR: YELLOW
CREAT SERPL-MCNC: 1.68 MG/DL (ref 0.57–1)
EOSINOPHIL # BLD AUTO: 0.4 X10E3/UL (ref 0–0.4)
EOSINOPHIL NFR BLD AUTO: 5 %
EPI CELLS #/AREA URNS HPF: NORMAL /HPF
ERYTHROCYTE [DISTWIDTH] IN BLOOD BY AUTOMATED COUNT: 14.3 % (ref 12.3–15.4)
GLOBULIN SER CALC-MCNC: 2.4 G/DL (ref 1.5–4.5)
GLUCOSE SERPL-MCNC: 79 MG/DL (ref 65–99)
GLUCOSE UR QL: NEGATIVE
HCT VFR BLD AUTO: 36.6 % (ref 34–46.6)
HDLC SERPL-MCNC: 108 MG/DL
HGB BLD-MCNC: 12.2 G/DL (ref 11.1–15.9)
HGB UR QL STRIP: NEGATIVE
IMM GRANULOCYTES # BLD: 0 X10E3/UL (ref 0–0.1)
IMM GRANULOCYTES NFR BLD: 0 %
KETONES UR QL STRIP: NEGATIVE
LDLC SERPL CALC-MCNC: 61 MG/DL (ref 0–99)
LEUKOCYTE ESTERASE UR QL STRIP: ABNORMAL
LYMPHOCYTES # BLD AUTO: 2.1 X10E3/UL (ref 0.7–3.1)
LYMPHOCYTES NFR BLD AUTO: 24 %
MCH RBC QN AUTO: 32 PG (ref 26.6–33)
MCHC RBC AUTO-ENTMCNC: 33.3 G/DL (ref 31.5–35.7)
MCV RBC AUTO: 96 FL (ref 79–97)
MICRO URNS: ABNORMAL
MONOCYTES # BLD AUTO: 0.7 X10E3/UL (ref 0.1–0.9)
MONOCYTES NFR BLD AUTO: 8 %
NEUTROPHILS # BLD AUTO: 5.6 X10E3/UL (ref 1.4–7)
NEUTROPHILS NFR BLD AUTO: 63 %
NITRITE UR QL STRIP: NEGATIVE
PH UR STRIP: 6.5 [PH] (ref 5–7.5)
PLATELET # BLD AUTO: 165 X10E3/UL (ref 150–379)
POTASSIUM SERPL-SCNC: 4 MMOL/L (ref 3.5–5.2)
PROT SERPL-MCNC: 6.8 G/DL (ref 6–8.5)
PROT UR QL STRIP: NEGATIVE
RBC # BLD AUTO: 3.81 X10E6/UL (ref 3.77–5.28)
RBC #/AREA URNS HPF: NORMAL /HPF
SODIUM SERPL-SCNC: 143 MMOL/L (ref 134–144)
SP GR UR: 1.01 (ref 1–1.03)
TRIGL SERPL-MCNC: 77 MG/DL (ref 0–149)
TSH SERPL DL<=0.005 MIU/L-ACNC: 0.63 UIU/ML (ref 0.45–4.5)
UROBILINOGEN UR STRIP-MCNC: 0.2 MG/DL (ref 0.2–1)
VLDLC SERPL CALC-MCNC: 15 MG/DL (ref 5–40)
WBC # BLD AUTO: 8.8 X10E3/UL (ref 3.4–10.8)
WBC #/AREA URNS HPF: NORMAL /HPF

## 2017-08-09 ENCOUNTER — HOSPITAL ENCOUNTER (OUTPATIENT)
Dept: MAMMOGRAPHY | Age: 67
Discharge: HOME | End: 2017-08-09
Attending: INTERNAL MEDICINE
Payer: MEDICARE

## 2017-08-09 DIAGNOSIS — M19.90 OSTEOARTHRITIS, UNSPECIFIED OSTEOARTHRITIS TYPE, UNSPECIFIED SITE: ICD-10-CM

## 2017-08-09 DIAGNOSIS — Z12.31 VISIT FOR SCREENING MAMMOGRAM: ICD-10-CM

## 2017-08-09 PROCEDURE — 77067 SCR MAMMO BI INCL CAD: CPT

## 2017-08-09 RX ORDER — NABUMETONE 500 MG/1
TABLET, FILM COATED ORAL
Qty: 180 TAB | Refills: 3 | Status: SHIPPED | COMMUNITY
Start: 2017-08-09 | End: 2018-11-07 | Stop reason: ALTCHOICE

## 2017-08-26 DIAGNOSIS — I10 ESSENTIAL HYPERTENSION: ICD-10-CM

## 2017-08-27 RX ORDER — VALSARTAN 80 MG/1
TABLET ORAL
Qty: 90 TAB | Refills: 1 | Status: SHIPPED | OUTPATIENT
Start: 2017-08-27 | End: 2018-02-02 | Stop reason: SDUPTHER

## 2018-01-24 ENCOUNTER — OFFICE VISIT (OUTPATIENT)
Dept: INTERNAL MEDICINE CLINIC | Age: 68
End: 2018-01-24

## 2018-01-24 VITALS
SYSTOLIC BLOOD PRESSURE: 121 MMHG | OXYGEN SATURATION: 100 % | HEIGHT: 60 IN | TEMPERATURE: 97.8 F | RESPIRATION RATE: 16 BRPM | BODY MASS INDEX: 28.58 KG/M2 | DIASTOLIC BLOOD PRESSURE: 66 MMHG | WEIGHT: 145.6 LBS | HEART RATE: 64 BPM

## 2018-01-24 DIAGNOSIS — E78.2 MIXED HYPERLIPIDEMIA: Primary | ICD-10-CM

## 2018-01-24 DIAGNOSIS — E53.8 B12 DEFICIENCY: ICD-10-CM

## 2018-01-24 DIAGNOSIS — M79.609 PARESTHESIA AND PAIN OF EXTREMITY: ICD-10-CM

## 2018-01-24 DIAGNOSIS — R20.2 PARESTHESIA AND PAIN OF EXTREMITY: ICD-10-CM

## 2018-01-24 DIAGNOSIS — M19.90 OSTEOARTHRITIS, UNSPECIFIED OSTEOARTHRITIS TYPE, UNSPECIFIED SITE: ICD-10-CM

## 2018-01-24 DIAGNOSIS — R41.3 MEMORY LOSS: ICD-10-CM

## 2018-01-24 DIAGNOSIS — I10 ESSENTIAL HYPERTENSION: ICD-10-CM

## 2018-01-24 NOTE — MR AVS SNAPSHOT
[address] [facility] [address] [place] [address] 
[phone] Patient: Aida Hamilton MRN:  [id number] Visit Information Date & Time Provider Department Dept. Phone Encounter #  
 1/24/2018  9:35 AM Raul Osborn MD [facility] [phone] [id number] Follow-up Instructions Return in about 6 months (around 7/24/2018) for mwv. Upcoming Health Maintenance Date Due  
 GLAUCOMA SCREENING Q2Y 2/9/2018 MEDICARE YEARLY EXAM 7/25/2018 BREAST CANCER SCRN MAMMOGRAM 8/9/2019 DTaP/Tdap/Td series (2 - Td) 6/13/2021 COLONOSCOPY 10/4/2026 Allergies as of 1/24/2018  Review Complete On: 1/24/2018 By: Raul Osborn MD  
  
 Severity Noted Reaction Type Reactions Adhesive Tape  08/25/2009    Rash Motrin [Ibuprofen]  08/25/2009    Other (comments) Joint stiffness Percocet [Oxycodone-acetaminophen]  08/25/2009    Hives Current Immunizations  Reviewed on 8/10/2017 Name Date Influenza High Dose Vaccine PF 10/26/2016 Pneumococcal Conjugate (PCV-13) 8/4/2017 Pneumococcal Polysaccharide (PPSV-23) 7/18/2016 TDAP Vaccine 6/13/2011  3:16 PM  
 Varicella Virus Vaccine Live 11/1/2011 Zoster Vaccine, Live 11/1/2011 Not reviewed this visit You Were Diagnosed With   
  
 Codes Comments Mixed hyperlipidemia    -  Primary ICD-10-CM: C57.0 ICD-9-CM: 272.2 Osteoarthritis, unspecified osteoarthritis type, unspecified site     ICD-10-CM: M19.90 ICD-9-CM: 715.90 Memory loss     ICD-10-CM: R41.3 ICD-9-CM: 780.93 Essential hypertension     ICD-10-CM: I10 
ICD-9-CM: 401.9 Paresthesia and pain of extremity     ICD-10-CM: R20.2, M79.609 ICD-9-CM: 782.0, 729.5 B12 deficiency     ICD-10-CM: E53.8 ICD-9-CM: 266.2 Vitals BP Pulse Temp Resp Height(growth percentile) Weight(growth percentile) 121/66 (BP 1 Location: Right arm, BP Patient Position: Sitting) 64 97.8 °F (36.6 °C) (Oral) 16 5' (1.524 m) 145 lb 9.6 oz (66 kg) SpO2 BMI OB Status Smoking Status 100% 28.44 kg/m2 Hysterectomy Never Smoker BMI and BSA Data Body Mass Index Body Surface Area  
 28.44 kg/m 2 1.67 m 2 Preferred Pharmacy Pharmacy Name Phone 99 [facility], [facility] Jennifer Curtis [phone] Your Updated Medication List  
  
   
This list is accurate as of: 1/24/18 10:27 AM.  Always use your most recent med list.  
  
  
  
  
 FISH OIL PO Take  by mouth.  
  
 garlic 4,518 mg Cap Take 1 Cap by mouth daily. hydroCHLOROthiazide 25 mg tablet Commonly known as:  HYDRODIURIL  
TAKE 1 TABLET DAILY  
  
 memantine 10 mg tablet Commonly known as:  Elias Reno Take  by mouth two (2) times a day. metoprolol tartrate 100 mg IR tablet Commonly known as:  LOPRESSOR  
TAKE 1 TABLET BY MOUTH TWICE DAILY  
  
 nabumetone 500 mg tablet Commonly known as:  RELAFEN  
TAKE 1 TABLET BY MOUTH TWICE DAILY AS NEEDED FOR PAIN  
  
 potassium chloride 20 mEq tablet Commonly known as:  K-DUR, KLOR-CON  
TAKE 1 TABLET DAILY  
  
 rivastigmine 9.5 mg/24 hr patch Commonly known as:  EXELON  
1 Patch by TransDERmal route daily. Per neurology  
  
 simvastatin 20 mg tablet Commonly known as:  ZOCOR  
TAKE 1 TABLET BY MOUTH NIGHTLY  
  
 valsartan 80 mg tablet Commonly known as:  DIOVAN  
TAKE 1 TABLET BY MOUTH DAILY  
  
 VITAMIN D3 1,000 unit tablet Generic drug:  cholecalciferol Take  by mouth daily. We Performed the Following CBC WITH AUTOMATED DIFF [10929 CPT(R)] LIPID PANEL [37947 CPT(R)] METABOLIC PANEL, COMPREHENSIVE [31155 CPT(R)] TSH 3RD GENERATION [97699 CPT(R)] URINALYSIS W/ RFLX MICROSCOPIC [48894 CPT(R)] VITAMIN B12 [id number] CPT(R)] VITAMIN B12 [id number] CPT(R)] Follow-up Instructions Return in about 6 months (around 7/24/2018) for mwv. Introducing hospitals & HEALTH SERVICES! Dear Gibran Bagley: Thank you for requesting a Months Of Me account. Our records indicate that you already have an active Months Of Me account. You can access your account anytime at [email or web address]. [email or web address] Did you know that you can access your hospital and ER discharge instructions at any time in Months Of Me? You can also review all of your test results from your hospital stay or ER visit. Additional Information If you have questions, please visit the Frequently Asked Questions section of the Months Of Me website at [email or web address] Remember, Months Of Me is NOT to be used for urgent needs. For medical emergencies, dial 911. Now available from your iPhone and Android! Please provide this summary of care documentation to your next provider. Your primary care clinician is listed as MAHSA ROSEN. If you have any questions after today's visit, please call [phone].

## 2018-01-24 NOTE — PROGRESS NOTES
HISTORY OF PRESENT ILLNESS  Mandy Smith is a 78 y.o. female. JOLLY Tilley is seen today for follow up of hypertension and other problems. Her  Karly Lazcano accompanies and provides some of the history. 1. Hypertension. Stable on current regimen. 2. Hyperlipidemia. Due for routine lab recheck. 3. DJD. Stable with Relafen, no side effect noted   4. Dementia. Supportive care. She is generally very functional and I would classify her more as a mild cognitive impairment. 5. Preventive medicine. A Medicare Wellness Visit in six months. Review of systems notable for upper and lower extremity paresthesias. This is bilateral and present for about two months. She denies any weakness. Reviewed with them checking B-12 and a TSH if not done recently as well as discussing this with their neurologist at the follow up with him. MedDATA/gwo     Current Outpatient Prescriptions   Medication Sig    valsartan (DIOVAN) 80 mg tablet TAKE 1 TABLET BY MOUTH DAILY    nabumetone (RELAFEN) 500 mg tablet TAKE 1 TABLET BY MOUTH TWICE DAILY AS NEEDED FOR PAIN    metoprolol tartrate (LOPRESSOR) 100 mg IR tablet TAKE 1 TABLET BY MOUTH TWICE DAILY    hydroCHLOROthiazide (HYDRODIURIL) 25 mg tablet TAKE 1 TABLET DAILY    simvastatin (ZOCOR) 20 mg tablet TAKE 1 TABLET BY MOUTH NIGHTLY    potassium chloride (K-DUR, KLOR-CON) 20 mEq tablet TAKE 1 TABLET DAILY    memantine (NAMENDA) 10 mg tablet Take  by mouth two (2) times a day.  rivastigmine (EXELON) 9.5 mg/24 hr patch 1 Patch by TransDERmal route daily. Per neurology    DOCOSAHEXANOIC ACID/EPA (FISH OIL PO) Take  by mouth.  cholecalciferol (VITAMIN D3) 1,000 unit tablet Take  by mouth daily.  garlic 0,706 mg Cap Take 1 Cap by mouth daily. No current facility-administered medications for this visit. Review of Systems   Constitutional: Negative for weight loss. Respiratory: Negative.     Cardiovascular: Negative for chest pain, palpitations, leg swelling and PND. Musculoskeletal: Negative for myalgias. Neurological: Positive for tingling and sensory change. Negative for focal weakness. Psychiatric/Behavioral: Positive for memory loss. Physical Exam   Constitutional: She appears well-nourished. Neck: Carotid bruit is not present. Cardiovascular: Normal rate, regular rhythm and intact distal pulses. Exam reveals no gallop and no friction rub. No murmur heard. Pulmonary/Chest: Effort normal and breath sounds normal. No respiratory distress. Musculoskeletal: She exhibits no edema. Nursing note and vitals reviewed. ASSESSMENT and PLAN  Diagnoses and all orders for this visit:    1. Mixed hyperlipidemia  -     METABOLIC PANEL, COMPREHENSIVE  -     CBC WITH AUTOMATED DIFF  -     LIPID PANEL    2. Osteoarthritis, unspecified osteoarthritis type, unspecified site- Continue current regimen of prescription and / or OTC medications     3. Memory loss- Continue current regimen of prescription and / or OTC medications , See neurologist as directed     4. Essential hypertension  -     URINALYSIS W/ RFLX MICROSCOPIC    5. Paresthesia and pain of extremity  -     TSH 3RD GENERATION    6.  B12 deficiency  -     VITAMIN B12  -     VITAMIN B12    Plan was reviewed with patient and family, understanding expressed

## 2018-02-02 DIAGNOSIS — I10 ESSENTIAL HYPERTENSION: ICD-10-CM

## 2018-02-03 RX ORDER — VALSARTAN 80 MG/1
TABLET ORAL
Qty: 90 TAB | Refills: 0 | Status: SHIPPED | OUTPATIENT
Start: 2018-02-03 | End: 2018-04-27 | Stop reason: SDUPTHER

## 2018-02-19 ENCOUNTER — PATIENT MESSAGE (OUTPATIENT)
Dept: INTERNAL MEDICINE CLINIC | Age: 68
End: 2018-02-19

## 2018-02-20 ENCOUNTER — HOSPITAL ENCOUNTER (OUTPATIENT)
Dept: LAB | Age: 68
Discharge: HOME | End: 2018-02-20
Payer: MEDICARE

## 2018-02-20 PROCEDURE — 81001 URINALYSIS AUTO W/SCOPE: CPT

## 2018-02-20 PROCEDURE — 85025 COMPLETE CBC W/AUTO DIFF WBC: CPT

## 2018-02-20 PROCEDURE — 84443 ASSAY THYROID STIM HORMONE: CPT

## 2018-02-20 PROCEDURE — 36415 COLL VENOUS BLD VENIPUNCTURE: CPT

## 2018-02-20 PROCEDURE — 80053 COMPREHEN METABOLIC PANEL: CPT

## 2018-02-20 PROCEDURE — 82607 VITAMIN B-12: CPT

## 2018-02-20 PROCEDURE — 80061 LIPID PANEL: CPT

## 2018-02-21 DIAGNOSIS — I10 ESSENTIAL HYPERTENSION: ICD-10-CM

## 2018-02-21 LAB
ALBUMIN SERPL-MCNC: 4.4 G/DL (ref 3.6–4.8)
ALBUMIN/GLOB SERPL: 1.8 {RATIO} (ref 1.2–2.2)
ALP SERPL-CCNC: 60 IU/L (ref 39–117)
ALT SERPL-CCNC: 16 IU/L (ref 0–32)
APPEARANCE UR: CLEAR
AST SERPL-CCNC: 23 IU/L (ref 0–40)
BACTERIA #/AREA URNS HPF: NORMAL /[HPF]
BASOPHILS # BLD AUTO: 0 X10E3/UL (ref 0–0.2)
BASOPHILS NFR BLD AUTO: 0 %
BILIRUB SERPL-MCNC: 0.2 MG/DL (ref 0–1.2)
BILIRUB UR QL STRIP: NEGATIVE
BUN SERPL-MCNC: 46 MG/DL (ref 8–27)
BUN/CREAT SERPL: 21 (ref 12–28)
CALCIUM SERPL-MCNC: 9.5 MG/DL (ref 8.7–10.3)
CASTS URNS QL MICRO: NORMAL /LPF
CHLORIDE SERPL-SCNC: 101 MMOL/L (ref 96–106)
CHOLEST SERPL-MCNC: 196 MG/DL (ref 100–199)
CO2 SERPL-SCNC: 25 MMOL/L (ref 18–29)
COLOR UR: YELLOW
CREAT SERPL-MCNC: 2.17 MG/DL (ref 0.57–1)
EOSINOPHIL # BLD AUTO: 0.7 X10E3/UL (ref 0–0.4)
EOSINOPHIL NFR BLD AUTO: 7 %
EPI CELLS #/AREA URNS HPF: NORMAL /HPF
ERYTHROCYTE [DISTWIDTH] IN BLOOD BY AUTOMATED COUNT: 13.7 % (ref 12.3–15.4)
GFR SERPLBLD CREATININE-BSD FMLA CKD-EPI: 23 ML/MIN/{1.73_M2}
GFR SERPLBLD CREATININE-BSD FMLA CKD-EPI: 27 ML/MIN/{1.73_M2}
GLOBULIN SER CALC-MCNC: 2.4 G/L (ref 1.5–4.5)
GLUCOSE SERPL-MCNC: 79 MG/DL (ref 65–99)
GLUCOSE UR QL: NEGATIVE
HCT VFR BLD AUTO: 35.7 % (ref 34–46.6)
HDLC SERPL-MCNC: 111 MG/DL
HGB BLD-MCNC: 11.7 G/DL (ref 11.1–15.9)
HGB UR QL STRIP: NEGATIVE
IMM GRANULOCYTES # BLD: 0 X10E3/UL (ref 0–0.1)
IMM GRANULOCYTES NFR BLD: 0 %
KETONES UR QL STRIP: NEGATIVE
LDLC SERPL CALC-MCNC: 72 MG/DL (ref 0–99)
LEUKOCYTE ESTERASE UR QL STRIP: ABNORMAL
LYMPHOCYTES # BLD AUTO: 2.5 X10E3/UL (ref 0.7–3.1)
LYMPHOCYTES NFR BLD AUTO: 26 %
MCH RBC QN AUTO: 32.2 PG (ref 26.6–33)
MCHC RBC AUTO-ENTMCNC: 32.8 G/DL (ref 31.5–35.7)
MCV RBC AUTO: 98 FL (ref 79–97)
MICRO URNS: ABNORMAL
MONOCYTES # BLD AUTO: 0.7 X10E3/UL (ref 0.1–0.9)
MONOCYTES NFR BLD AUTO: 8 %
NEUTROPHILS # BLD AUTO: 5.5 X10E3/UL (ref 1.4–7)
NEUTROPHILS NFR BLD AUTO: 59 %
NITRITE UR QL STRIP: NEGATIVE
PH UR STRIP: 6.5 [PH] (ref 5–7.5)
PLATELET # BLD AUTO: 170 X10E3/UL (ref 150–379)
POTASSIUM SERPL-SCNC: 4 MMOL/L (ref 3.5–5.2)
PROT SERPL-MCNC: 6.8 G/DL (ref 6–8.5)
PROT UR QL STRIP: NEGATIVE
RBC # BLD AUTO: 3.63 X10E6/UL (ref 3.77–5.28)
RBC #/AREA URNS HPF: NORMAL /HPF
SODIUM SERPL-SCNC: 145 MMOL/L (ref 134–144)
SP GR UR: 1.01 (ref 1–1.03)
TRIGL SERPL-MCNC: 65 MG/DL (ref 0–149)
TSH SERPL DL<=0.005 MIU/L-ACNC: 1.07 UIU/ML (ref 0.45–4.5)
UROBILINOGEN UR STRIP-MCNC: 0.2 MG/DL (ref 0.2–1)
VIT B12 SERPL-MCNC: 306 PG/ML (ref 232–1245)
VLDLC SERPL CALC-MCNC: 13 MG/DL (ref 5–40)
WBC # BLD AUTO: 9.4 X10E3/UL (ref 3.4–10.8)
WBC #/AREA URNS HPF: NORMAL /HPF

## 2018-02-22 RX ORDER — METOPROLOL TARTRATE 100 MG/1
TABLET ORAL
Qty: 180 TAB | Refills: 1 | Status: SHIPPED | COMMUNITY
Start: 2018-02-22 | End: 2018-08-22 | Stop reason: SDUPTHER

## 2018-02-27 ENCOUNTER — TELEPHONE (OUTPATIENT)
Dept: INTERNAL MEDICINE CLINIC | Age: 68
End: 2018-02-27

## 2018-02-28 DIAGNOSIS — R79.82 ELEVATED C-REACTIVE PROTEIN: ICD-10-CM

## 2018-02-28 DIAGNOSIS — E53.8 B12 DEFICIENCY: Primary | ICD-10-CM

## 2018-02-28 NOTE — TELEPHONE ENCOUNTER
Advised pt's  pt's kidney function has worsened - cr 2.17. She needs to stop the NSAID nabumetone for 2 weeks - repeat bmp- fasting not required. Also her b 12 is borderline. Additional test methylmalonic acid will see if she needs treatment. Do this in 2 wks as well. He has requested to mail lab slip - done.

## 2018-03-09 ENCOUNTER — HOSPITAL ENCOUNTER (OUTPATIENT)
Dept: LAB | Age: 68
Discharge: HOME | End: 2018-03-09
Payer: MEDICARE

## 2018-03-09 PROCEDURE — 36415 COLL VENOUS BLD VENIPUNCTURE: CPT

## 2018-03-09 PROCEDURE — 83921 ORGANIC ACID SINGLE QUANT: CPT

## 2018-03-09 PROCEDURE — 80048 BASIC METABOLIC PNL TOTAL CA: CPT

## 2018-03-13 LAB
BUN SERPL-MCNC: 49 MG/DL (ref 8–27)
BUN/CREAT SERPL: 23 (ref 12–28)
CALCIUM SERPL-MCNC: 9.8 MG/DL (ref 8.7–10.3)
CHLORIDE SERPL-SCNC: 100 MMOL/L (ref 96–106)
CO2 SERPL-SCNC: 25 MMOL/L (ref 18–29)
CREAT SERPL-MCNC: 2.15 MG/DL (ref 0.57–1)
GFR SERPLBLD CREATININE-BSD FMLA CKD-EPI: 23 ML/MIN/1.73
GFR SERPLBLD CREATININE-BSD FMLA CKD-EPI: 27 ML/MIN/1.73
GLUCOSE SERPL-MCNC: 79 MG/DL (ref 65–99)
METHYLMALONATE SERPL-SCNC: 473 NMOL/L (ref 0–378)
POTASSIUM SERPL-SCNC: 4.2 MMOL/L (ref 3.5–5.2)
SODIUM SERPL-SCNC: 144 MMOL/L (ref 134–144)

## 2018-03-19 ENCOUNTER — TELEPHONE (OUTPATIENT)
Dept: INTERNAL MEDICINE CLINIC | Age: 68
End: 2018-03-19

## 2018-03-19 DIAGNOSIS — N18.30 ACUTE WORSENING OF STAGE 3 CHRONIC KIDNEY DISEASE (HCC): Primary | ICD-10-CM

## 2018-03-19 DIAGNOSIS — R94.4 ABNORMAL KIDNEY FUNCTION STUDY: ICD-10-CM

## 2018-03-19 NOTE — TELEPHONE ENCOUNTER
Advised pt's  pt scheduled at [facility] on 3/23/18 at 11 am. He is aware of appt location and to arrive by 10:45 am.

## 2018-03-19 NOTE — TELEPHONE ENCOUNTER
Advised pt's  Dereck Albarado (on HIPPA) - pt's kidney function the same - worse compared to baseline. MD wants to check renal ultrasound based on results - she will need specialist referral. Also special testing shows b 12 deficiency - start OTC b 12 1000 mcg every day. He has asked to schedule US for this Friday 3/23/18. I will call back with time.

## 2018-03-23 ENCOUNTER — HOSPITAL ENCOUNTER (OUTPATIENT)
Dept: ULTRASOUND IMAGING | Age: 68
Discharge: HOME | End: 2018-03-23
Attending: INTERNAL MEDICINE
Payer: MEDICARE

## 2018-03-23 DIAGNOSIS — R94.4 ABNORMAL KIDNEY FUNCTION STUDY: ICD-10-CM

## 2018-03-23 DIAGNOSIS — N18.30 ACUTE WORSENING OF STAGE 3 CHRONIC KIDNEY DISEASE (HCC): ICD-10-CM

## 2018-03-23 PROCEDURE — 76770 US EXAM ABDO BACK WALL COMP: CPT

## 2018-03-24 NOTE — PROGRESS NOTES
Call - the US is fine except for slightly small right kidney. See nephrologist as directed.  Schedule consult with Dr Eriberto Shaw in [place]

## 2018-03-27 ENCOUNTER — TELEPHONE (OUTPATIENT)
Dept: INTERNAL MEDICINE CLINIC | Age: 68
End: 2018-03-27

## 2018-03-27 DIAGNOSIS — N18.30 STAGE 3 CHRONIC KIDNEY DISEASE (HCC): Primary | ICD-10-CM

## 2018-03-27 NOTE — PROGRESS NOTES
Advised pt's  - the US is fine except for slightly small right kidney. See nephrologist as directed. Schedule consult with Dr Victorino Mendosa in [place]. Called Dr Leeann Giron office - spoke with Danna Maurer - she states we need to send last 3 office notes, last 3 labs results, US report and demographics. Once received and reviewed they will call pt to schedule appt. Faxed all what was requested to [phone]. Confirmation received. Pt's  is aware.

## 2018-03-30 RX ORDER — POTASSIUM CHLORIDE 20 MEQ/1
TABLET, EXTENDED RELEASE ORAL
Qty: 90 TAB | Refills: 3 | Status: SHIPPED | OUTPATIENT
Start: 2018-03-30 | End: 2019-04-10 | Stop reason: SDUPTHER

## 2018-04-11 ENCOUNTER — TELEPHONE (OUTPATIENT)
Dept: INTERNAL MEDICINE CLINIC | Age: 68
End: 2018-04-11

## 2018-04-12 NOTE — TELEPHONE ENCOUNTER
Spoke with pt's  to give names of podiatrists. Asked him what was going on with her feet to request this. He states recently noticed pt right leg and foot swollen - her varicose veins are swollen also - she states are painful. He states pt was seen several years ago at vein center in [place], [place] for treatment. Advised him pt may need to see different specialist than a podiatrist. Will forward to MD. Antonio Joy in [place], [place] ([phone]) - spoke with Jennie Wolff - states pt first seen back in 2/2015 - had laser treatment on both legs. Last visit was 6/2016. Requested pt's medical records. Forward to medical records - spoke with Arnel Naik - she will fax records today.

## 2018-04-13 NOTE — TELEPHONE ENCOUNTER
Spoke with pt's  - advised MD recommends appt with him first, then likely referral back to vein center or another specialist. He states pt was complaining of severe foot pain that he took her to [facility] in [facility] last evening. States she saw Dr Aden Whalen done - no fractures. Labs done - pending. Discharged home with dx possible gout right foot on 2 meds - he was not sure what they are - picking up from [facility] today. She has a fuv there on Tuesday 4/17/18. Advised him I will call to get her records from visit for MD to review. Also asked him to call us on Monday to update us on how she is doing. Will forward to MD.    Called [facility] [phone] - spoke with Андрей Salinas requesting pt's records from yesterday's appt. Received records - on MD's desk to review. The 2 meds given were Diclofenac Potassium 50 mg one tab tid x 10 days and Prednisone 10 mg one tab qid x 5 days.

## 2018-04-16 ENCOUNTER — TELEPHONE (OUTPATIENT)
Dept: INTERNAL MEDICINE CLINIC | Age: 68
End: 2018-04-16

## 2018-04-16 NOTE — TELEPHONE ENCOUNTER
Spoke with pt's  Myriam Nava - he wanted Dr Lester Trevino to know that [facility] called them today with lab results. Pt's uric acid is high. Pt started on Allopurinol 100 mg daily. Pt has fuv tomorrow with them. Asked if he would have them send note from visit tomorrow's visit and lab results.  Will forward to MD.

## 2018-04-27 DIAGNOSIS — I10 ESSENTIAL HYPERTENSION: ICD-10-CM

## 2018-04-27 RX ORDER — VALSARTAN 80 MG/1
TABLET ORAL
Qty: 90 TAB | Refills: 3 | Status: SHIPPED | OUTPATIENT
Start: 2018-04-27 | End: 2018-07-20

## 2018-06-11 RX ORDER — HYDROCHLOROTHIAZIDE 25 MG/1
TABLET ORAL
Qty: 90 TAB | Refills: 1 | Status: SHIPPED | COMMUNITY
Start: 2018-06-11 | End: 2018-11-07 | Stop reason: SDUPTHER

## 2018-06-21 ENCOUNTER — OFFICE VISIT (OUTPATIENT)
Dept: INTERNAL MEDICINE CLINIC | Age: 68
End: 2018-06-21

## 2018-06-21 VITALS
RESPIRATION RATE: 16 BRPM | TEMPERATURE: 98.4 F | OXYGEN SATURATION: 98 % | HEIGHT: 60 IN | HEART RATE: 65 BPM | DIASTOLIC BLOOD PRESSURE: 70 MMHG | SYSTOLIC BLOOD PRESSURE: 110 MMHG

## 2018-06-21 DIAGNOSIS — M79.672 BILATERAL FOOT PAIN: Primary | ICD-10-CM

## 2018-06-21 DIAGNOSIS — M79.671 BILATERAL FOOT PAIN: Primary | ICD-10-CM

## 2018-06-21 RX ORDER — TRAMADOL HYDROCHLORIDE 50 MG/1
50 TABLET ORAL
Qty: 30 TAB | Refills: 0 | Status: SHIPPED | OUTPATIENT
Start: 2018-06-21 | End: 2020-08-24

## 2018-06-21 RX ORDER — PREDNISONE 10 MG/1
10 TABLET ORAL SEE ADMIN INSTRUCTIONS
Qty: 21 TAB | Refills: 0 | Status: SHIPPED | OUTPATIENT
Start: 2018-06-21 | End: 2018-08-02 | Stop reason: SDUPTHER

## 2018-07-05 NOTE — PROGRESS NOTES
Acute Care Note    Isaak Hogan is 80 y.o. female. she presents for evaluation of Foot Swelling    The patient comes to discuss bilateral foot pain. This appears to have been a long-standing issue. She has been evaluated for this in the past.  Most recently in April of this year. At that time, the patient had an x-ray of the right foot which showed some evidence of swelling over the dorsal aspect of the foot. However, there is no bony abnormality, no other evidence of inflammation. She and her  tell me that she has been to podiatry however not recently. They have had no clear diagnosis for the foot pain as of yet. In the past, she has been given steroid tapers which seemed to improve the pain. Prior to Admission medications    Medication Sig Start Date End Date Taking? Authorizing Provider   predniSONE (STERAPRED DS) 10 mg dose pack Take 1 Tab by mouth See Admin Instructions. See administration instruction per 10mg dose pack 6/21/18  Yes Darlene Bhakta MD   traMADol Pill[facility] Talbert) 50 mg tablet Take 1 Tab by mouth every six (6) hours as needed for Pain. Max Daily Amount: 200 mg. 6/21/18  Yes Darlene Bhakta MD   hydroCHLOROthiazide (HYDRODIURIL) 25 mg tablet TAKE 1 TABLET BY MOUTH DAILY 6/11/18  Yes Carmelita Benjamin MD   valsartan (DIOVAN) 80 mg tablet TAKE 1 TABLET BY MOUTH DAILY 4/27/18  Yes Carmelita Benjamin MD   potassium chloride (K-DUR, KLOR-CON) 20 mEq tablet TAKE 1 TABLET BY MOUTH DAILY 3/30/18  Yes Carmelita Benjamin MD   metoprolol tartrate (LOPRESSOR) 100 mg IR tablet TAKE 1 TABLET BY MOUTH TWICE DAILY 2/22/18  Yes Carmelita Benjamin MD   hydroCHLOROthiazide (HYDRODIURIL) 25 mg tablet TAKE 1 TABLET DAILY 4/24/17  Yes Carmelita Benjamin MD   simvastatin (ZOCOR) 20 mg tablet TAKE 1 TABLET BY MOUTH NIGHTLY 3/27/17  Yes Carmelita Benjamin MD   memantine [facility]) 10 mg tablet Take  by mouth two (2) times a day.    Yes Historical Provider   rivastigmine (EXELON) 9.5 mg/24 hr patch 1 Patch by TransDERmal route daily. Per neurology 1/13/16  Yes Sadie Long MD   [facility] ACID/EPA (FISH OIL PO) Take  by mouth. Yes Historical Provider   cholecalciferol (VITAMIN D3) 1,000 unit tablet Take  by mouth daily. Yes Historical Provider   garlic 6,946 mg Cap Take 1 Cap by mouth daily. 8/5/10  Yes Historical Provider   nabumetone (RELAFEN) 500 mg tablet TAKE 1 TABLET BY MOUTH TWICE DAILY AS NEEDED FOR PAIN 8/9/17   Sadie Long MD         Patient Active Problem List   Diagnosis Code    Arthritis M19.90    Hypertension I10    Herpes zoster without mention of complication K98.8    Diverticulosis of colon (without mention of hemorrhage) K57.30    Memory loss R41.3    Unspecified sleep apnea G47.30    Encounter for long-term (current) use of other medications Z79.899    Mixed hyperlipidemia E78.2    Advance directive discussed with patient Z71.89         Review of Systems   Constitutional: Negative. Respiratory: Negative. Cardiovascular: Negative. Musculoskeletal:        Foot pain as per HPI         Visit Vitals    /70 (BP 1 Location: Right arm, BP Patient Position: Sitting)    Pulse 65    Temp 98.4 °F (36.9 °C) (Oral)    Resp 16    Ht 5' (1.524 m)    SpO2 98%       Physical Exam   Musculoskeletal:   Tenderness over the dorsal aspect of the right foot. No ankle pain. The patient is able to move the foot without pain. Her left foot appears to be less painful. I am unable to elicit any pain at this time           ASSESSMENT/PLAN  Diagnoses and all orders for this visit:    1. Bilateral foot pain -given the presence of the foot discomfort, as well as her previous improvement with steroid, I will begin a prednisone Dosepak. We will also provide patient with tramadol for pain. I have encouraged him to follow-up with her podiatrist.  They will return to the clinic if they are unable to obtain appropriate resolution.   -     predniSONE (STERAPRED DS) 10 mg dose pack; Take 1 Tab by mouth See Admin Instructions. See administration instruction per 10mg dose pack  -     traMADol (ULTRAM) 50 mg tablet; Take 1 Tab by mouth every six (6) hours as needed for Pain. Max Daily Amount: 200 mg. Advised the patient to call back or return to office if symptoms worsen/change/persist.   Discussed expected course/resolution/complications of diagnosis in detail with patient. Medication risks/benefits/costs/interactions/alternatives discussed with patient. The patient was given an after visit summary which includes diagnoses, current medications, & vitals. They expressed understanding with the diagnosis and plan.

## 2018-07-09 DIAGNOSIS — E78.2 MIXED HYPERLIPIDEMIA: ICD-10-CM

## 2018-07-10 RX ORDER — SIMVASTATIN 20 MG/1
TABLET, FILM COATED ORAL
Qty: 90 TAB | Refills: 2 | Status: SHIPPED | OUTPATIENT
Start: 2018-07-10 | End: 2019-03-30 | Stop reason: SDUPTHER

## 2018-07-16 ENCOUNTER — TELEPHONE (OUTPATIENT)
Dept: INTERNAL MEDICINE CLINIC | Age: 68
End: 2018-07-16

## 2018-07-17 DIAGNOSIS — Z12.31 VISIT FOR SCREENING MAMMOGRAM: Primary | ICD-10-CM

## 2018-07-17 NOTE — TELEPHONE ENCOUNTER
Spoke with pt's  Tootie Parry (on HIPPA) - MD advises she continue annual screening. Ordered in chart.

## 2018-07-20 RX ORDER — LOSARTAN POTASSIUM 50 MG/1
50 TABLET ORAL DAILY
Qty: 90 TAB | Refills: 3 | Status: SHIPPED | COMMUNITY
Start: 2018-07-20 | End: 2019-06-26 | Stop reason: SDUPTHER

## 2018-08-02 ENCOUNTER — HOSPITAL ENCOUNTER (OUTPATIENT)
Dept: GENERAL RADIOLOGY | Age: 68
Discharge: HOME | End: 2018-08-02
Payer: MEDICARE

## 2018-08-02 ENCOUNTER — OFFICE VISIT (OUTPATIENT)
Dept: INTERNAL MEDICINE CLINIC | Age: 68
End: 2018-08-02

## 2018-08-02 ENCOUNTER — HOSPITAL ENCOUNTER (OUTPATIENT)
Dept: LAB | Age: 68
Discharge: HOME | End: 2018-08-02
Payer: MEDICARE

## 2018-08-02 VITALS
OXYGEN SATURATION: 98 % | SYSTOLIC BLOOD PRESSURE: 112 MMHG | HEART RATE: 60 BPM | TEMPERATURE: 98.1 F | HEIGHT: 60 IN | RESPIRATION RATE: 16 BRPM | DIASTOLIC BLOOD PRESSURE: 62 MMHG

## 2018-08-02 DIAGNOSIS — M79.672 LEFT FOOT PAIN: Primary | ICD-10-CM

## 2018-08-02 DIAGNOSIS — M79.672 LEFT FOOT PAIN: ICD-10-CM

## 2018-08-02 PROCEDURE — 73630 X-RAY EXAM OF FOOT: CPT

## 2018-08-02 PROCEDURE — 36415 COLL VENOUS BLD VENIPUNCTURE: CPT

## 2018-08-02 PROCEDURE — 84550 ASSAY OF BLOOD/URIC ACID: CPT

## 2018-08-02 RX ORDER — PREDNISONE 10 MG/1
10 TABLET ORAL SEE ADMIN INSTRUCTIONS
Qty: 21 TAB | Refills: 0 | Status: SHIPPED | OUTPATIENT
Start: 2018-08-02 | End: 2018-08-14 | Stop reason: ALTCHOICE

## 2018-08-02 NOTE — PROGRESS NOTES
Chief Complaint   Patient presents with    Foot Pain     left    Foot Swelling     left     Patient states she is here for left foot pain. Patient's caregiver states patient has had the foot pain on and off since middle of April. Patient states has swelling to left foot as well.

## 2018-08-02 NOTE — PROGRESS NOTES
Acute Care Note    Radha Yu is 80 y.o. female. she presents for evaluation of Foot Pain (left) and Foot Swelling (left)    The patient presents with ongoing foot pain and swelling. This has been a chronic issue which has not resolved entirely as of yet. She has had evaluations in the past with diagnoses of gout at times. She has had similar pain in the past with improvement after a treatment with a steroid pack. She and her  report that she has had some elevated uric acid levels in the past.      Prior to Admission medications    Medication Sig Start Date End Date Taking? Authorizing Provider   predniSONE (STERAPRED DS) 10 mg dose pack Take 1 Tab by mouth See Admin Instructions. See administration instruction per 10mg dose pack 8/2/18  Yes Vee Castellanos MD   losartan (COZAAR) 50 mg tablet Take 1 Tab by mouth daily. 7/20/18  Yes Aline Goldman MD   simvastatin (ZOCOR) 20 mg tablet TAKE 1 TABLET BY MOUTH NIGHTLY 7/10/18  Yes Aline Goldman MD   hydroCHLOROthiazide (HYDRODIURIL) 25 mg tablet TAKE 1 TABLET BY MOUTH DAILY 6/11/18  Yes Aline Goldman MD   potassium chloride (K-DUR, KLOR-CON) 20 mEq tablet TAKE 1 TABLET BY MOUTH DAILY 3/30/18  Yes Aline Goldman MD   metoprolol tartrate (LOPRESSOR) 100 mg IR tablet TAKE 1 TABLET BY MOUTH TWICE DAILY 2/22/18  Yes Aline Goldman MD   hydroCHLOROthiazide (HYDRODIURIL) 25 mg tablet TAKE 1 TABLET DAILY 4/24/17  Yes Aline Goldman MD   memantine [facility]) 10 mg tablet Take  by mouth two (2) times a day. Yes Historical Provider   rivastigmine (EXELON) 9.5 mg/24 hr patch 1 Patch by TransDERmal route daily. Per neurology 1/13/16  Yes Aline Goldman MD   [facility] ACID/EPA (FISH OIL PO) Take  by mouth. Yes Historical Provider   cholecalciferol (VITAMIN D3) 1,000 unit tablet Take  by mouth daily. Yes Historical Provider   garlic 9,693 mg Cap Take 1 Cap by mouth daily.  8/5/10  Yes Historical Provider traMADol (ULTRAM) 50 mg tablet Take 1 Tab by mouth every six (6) hours as needed for Pain. Max Daily Amount: 200 mg. 6/21/18   Shyla Craft MD   nabumetone (RELAFEN) 500 mg tablet TAKE 1 TABLET BY MOUTH TWICE DAILY AS NEEDED FOR PAIN 8/9/17   Bozena Abraham MD         Patient Active Problem List   Diagnosis Code    Arthritis M19.90    Hypertension I10    Herpes zoster without mention of complication K65.9    Diverticulosis of colon (without mention of hemorrhage) K57.30    Memory loss R41.3    Unspecified sleep apnea G47.30    Encounter for long-term (current) use of other medications Z79.899    Mixed hyperlipidemia E78.2    Advance directive discussed with patient Z71.89         Review of Systems   Musculoskeletal:        Left foot pain as per HPI. Visit Vitals    /62 (BP 1 Location: Left arm, BP Patient Position: Sitting)    Pulse 60    Temp 98.1 °F (36.7 °C) (Oral)    Resp 16    Ht 5' (1.524 m)    SpO2 98%       Physical Exam   Musculoskeletal:   Mild edema noted at the left foot. There is tenderness to palpation as well. This is centered over the distal forefoot on the dorsal and the plantar surface. There is no erythema noted. ASSESSMENT/PLAN  Diagnoses and all orders for this visit:    1. Left foot pain  -     URIC ACID  -     XR FOOT LT MIN 3 V; Future  -     predniSONE (STERAPRED DS) 10 mg dose pack; Take 1 Tab by mouth See Admin Instructions. See administration instruction per 10mg dose pack         Advised the patient to call back or return to office if symptoms worsen/change/persist.   Discussed expected course/resolution/complications of diagnosis in detail with patient. Medication risks/benefits/costs/interactions/alternatives discussed with patient. The patient was given an after visit summary which includes diagnoses, current medications, & vitals. They expressed understanding with the diagnosis and plan.

## 2018-08-03 LAB — URATE SERPL-MCNC: 9.1 MG/DL (ref 2.5–7.1)

## 2018-08-13 ENCOUNTER — HOSPITAL ENCOUNTER (OUTPATIENT)
Dept: CT IMAGING | Age: 68
Discharge: HOME | End: 2018-08-13
Attending: INTERNAL MEDICINE
Payer: MEDICARE

## 2018-08-13 VITALS
HEART RATE: 53 BPM | BODY MASS INDEX: 25.4 KG/M2 | HEIGHT: 62 IN | OXYGEN SATURATION: 100 % | SYSTOLIC BLOOD PRESSURE: 138 MMHG | WEIGHT: 138 LBS | DIASTOLIC BLOOD PRESSURE: 58 MMHG | TEMPERATURE: 97.7 F | RESPIRATION RATE: 11 BRPM

## 2018-08-13 DIAGNOSIS — R89.9 UNSPECIFIED ABNORMAL FINDING IN SPECIMENS FROM OTHER ORGANS, SYSTEMS AND TISSUES: ICD-10-CM

## 2018-08-13 LAB
BASOPHILS # BLD: 0 K/UL (ref 0–0.1)
BASOPHILS NFR BLD: 0 % (ref 0–1)
DIFFERENTIAL METHOD BLD: ABNORMAL
EOSINOPHIL # BLD: 0.3 K/UL (ref 0–0.4)
EOSINOPHIL NFR BLD: 3 % (ref 0–7)
ERYTHROCYTE [DISTWIDTH] IN BLOOD BY AUTOMATED COUNT: 13.1 % (ref 11.5–14.5)
HCT VFR BLD AUTO: 37.8 % (ref 35–47)
HGB BLD-MCNC: 12.2 G/DL (ref 11.5–16)
IMM GRANULOCYTES # BLD: 0.1 K/UL (ref 0–0.04)
IMM GRANULOCYTES NFR BLD AUTO: 1 % (ref 0–0.5)
LYMPHOCYTES # BLD: 2.1 K/UL (ref 0.8–3.5)
LYMPHOCYTES NFR BLD: 25 % (ref 12–49)
MCH RBC QN AUTO: 31.5 PG (ref 26–34)
MCHC RBC AUTO-ENTMCNC: 32.3 G/DL (ref 30–36.5)
MCV RBC AUTO: 97.7 FL (ref 80–99)
MONOCYTES # BLD: 1 K/UL (ref 0–1)
MONOCYTES NFR BLD: 12 % (ref 5–13)
NEUTS SEG # BLD: 4.8 K/UL (ref 1.8–8)
NEUTS SEG NFR BLD: 59 % (ref 32–75)
NRBC # BLD: 0 K/UL (ref 0–0.01)
NRBC BLD-RTO: 0 PER 100 WBC
PLATELET # BLD AUTO: 177 K/UL (ref 150–400)
PMV BLD AUTO: 11.8 FL (ref 8.9–12.9)
RBC # BLD AUTO: 3.87 M/UL (ref 3.8–5.2)
RBC MORPH BLD: ABNORMAL
WBC # BLD AUTO: 8.3 K/UL (ref 3.6–11)

## 2018-08-13 PROCEDURE — 88342 IMHCHEM/IMCYTCHM 1ST ANTB: CPT | Performed by: INTERNAL MEDICINE

## 2018-08-13 PROCEDURE — 99152 MOD SED SAME PHYS/QHP 5/>YRS: CPT

## 2018-08-13 PROCEDURE — 88305 TISSUE EXAM BY PATHOLOGIST: CPT | Performed by: INTERNAL MEDICINE

## 2018-08-13 PROCEDURE — 88264 CHROMOSOME ANALYSIS 20-25: CPT | Performed by: INTERNAL MEDICINE

## 2018-08-13 PROCEDURE — 77030028872 HC BN BIOP NDL ON CNTRL TY TELE -C

## 2018-08-13 PROCEDURE — 85097 BONE MARROW INTERPRETATION: CPT | Performed by: INTERNAL MEDICINE

## 2018-08-13 PROCEDURE — 88184 FLOWCYTOMETRY/ TC 1 MARKER: CPT | Performed by: INTERNAL MEDICINE

## 2018-08-13 PROCEDURE — 99153 MOD SED SAME PHYS/QHP EA: CPT

## 2018-08-13 PROCEDURE — 36415 COLL VENOUS BLD VENIPUNCTURE: CPT | Performed by: RADIOLOGY

## 2018-08-13 PROCEDURE — 74011250636 HC RX REV CODE- 250/636

## 2018-08-13 PROCEDURE — 88365 INSITU HYBRIDIZATION (FISH): CPT | Performed by: INTERNAL MEDICINE

## 2018-08-13 PROCEDURE — 77012 CT SCAN FOR NEEDLE BIOPSY: CPT

## 2018-08-13 PROCEDURE — 74011250636 HC RX REV CODE- 250/636: Performed by: RADIOLOGY

## 2018-08-13 PROCEDURE — 88237 TISSUE CULTURE BONE MARROW: CPT | Performed by: INTERNAL MEDICINE

## 2018-08-13 PROCEDURE — 85025 COMPLETE CBC W/AUTO DIFF WBC: CPT | Performed by: RADIOLOGY

## 2018-08-13 PROCEDURE — 88374 M/PHMTRC ALYS ISHQUANT/SEMIQ: CPT | Performed by: INTERNAL MEDICINE

## 2018-08-13 PROCEDURE — 88311 DECALCIFY TISSUE: CPT | Performed by: INTERNAL MEDICINE

## 2018-08-13 PROCEDURE — 88364 INSITU HYBRIDIZATION (FISH): CPT | Performed by: INTERNAL MEDICINE

## 2018-08-13 PROCEDURE — 88341 IMHCHEM/IMCYTCHM EA ADD ANTB: CPT | Performed by: INTERNAL MEDICINE

## 2018-08-13 PROCEDURE — 88185 FLOWCYTOMETRY/TC ADD-ON: CPT | Performed by: INTERNAL MEDICINE

## 2018-08-13 PROCEDURE — 88313 SPECIAL STAINS GROUP 2: CPT | Performed by: INTERNAL MEDICINE

## 2018-08-13 PROCEDURE — 77030003666 HC NDL SPINAL BD -A

## 2018-08-13 RX ORDER — FENTANYL CITRATE 50 UG/ML
100 INJECTION, SOLUTION INTRAMUSCULAR; INTRAVENOUS
Status: DISCONTINUED | OUTPATIENT
Start: 2018-08-13 | End: 2018-08-16 | Stop reason: HOSPADM

## 2018-08-13 RX ORDER — LIDOCAINE HYDROCHLORIDE 10 MG/ML
INJECTION, SOLUTION EPIDURAL; INFILTRATION; INTRACAUDAL; PERINEURAL
Status: COMPLETED
Start: 2018-08-13 | End: 2018-08-13

## 2018-08-13 RX ORDER — SODIUM CHLORIDE 9 MG/ML
25 INJECTION, SOLUTION INTRAVENOUS CONTINUOUS
Status: DISCONTINUED | OUTPATIENT
Start: 2018-08-13 | End: 2018-08-16 | Stop reason: HOSPADM

## 2018-08-13 RX ORDER — MIDAZOLAM HYDROCHLORIDE 1 MG/ML
5 INJECTION, SOLUTION INTRAMUSCULAR; INTRAVENOUS
Status: DISCONTINUED | OUTPATIENT
Start: 2018-08-13 | End: 2018-08-16 | Stop reason: HOSPADM

## 2018-08-13 RX ADMIN — LIDOCAINE HYDROCHLORIDE 10 ML: 10 INJECTION, SOLUTION EPIDURAL; INFILTRATION; INTRACAUDAL; PERINEURAL at 10:51

## 2018-08-13 RX ADMIN — FENTANYL CITRATE 50 MCG: 50 INJECTION, SOLUTION INTRAMUSCULAR; INTRAVENOUS at 10:23

## 2018-08-13 RX ADMIN — MIDAZOLAM HYDROCHLORIDE 1 MG: 1 INJECTION, SOLUTION INTRAMUSCULAR; INTRAVENOUS at 10:23

## 2018-08-13 RX ADMIN — SODIUM CHLORIDE 25 ML/HR: 900 INJECTION, SOLUTION INTRAVENOUS at 09:44

## 2018-08-13 NOTE — PROGRESS NOTES
7420 - patients  states patient has been diagnosed with dementia, but asked that his wife sign consent. When assessed patient was unable to tell me current year she stated it was 2015, and she said she was in [facility], this happens to be where they live.  consented to procedure. 1119 - I have reviewed discharge instructions with the patient and spouse. The patient and spouse verbalized understanding.

## 2018-08-13 NOTE — PROCEDURES
PROCEDURE:Bone marrow biopsy. INDICATION:abnormal renal function. ANESTHESIA:local and sedation. COMPLICATION:NONE. SPECIMENS REMOVED:core and aspirate to cytopathologist.  BLOOD LOSS:NONE. /ASSISTANT:GLENDA Valentin RECOMMENDATIONS:none. CONSENT OBTAINED:YES.  NOTES:none.

## 2018-08-13 NOTE — H&P
Radiology History and Physical    Patient: Titi Deleon 80 y.o. female     Chief Complaint: No chief complaint on file. History of Present Illness: Abnormal renal function. History:    Past Medical History:   Diagnosis Date    Arthritis     KNEE PAIN    Diverticulosis     Hypercholesteremia     Hypertension     Unspecified sleep apnea     Vitamin B 12 deficiency     Zoster      Family History   Problem Relation Age of Onset    Hypertension Mother     Stroke Mother     Diabetes Father     Hypertension Father     Alcohol abuse Neg Hx     Arthritis-osteo Neg Hx     Asthma Neg Hx     Bleeding Prob Neg Hx     Cancer Neg Hx     Elevated Lipids Neg Hx     Headache Neg Hx     Heart Disease Neg Hx     Lung Disease Neg Hx     Migraines Neg Hx     Psychiatric Disorder Neg Hx     Mental Retardation Neg Hx      Social History     Social History    Marital status:      Spouse name: N/A    Number of children: N/A    Years of education: N/A     Occupational History    Not on file. Social History Main Topics    Smoking status: Never Smoker    Smokeless tobacco: Never Used    Alcohol use 0.0 oz/week     0 Standard drinks or equivalent per week      Comment: 1 PER DAY rare    Drug use: No    Sexual activity: Not on file     Other Topics Concern    Not on file     Social History Narrative       Allergies: Allergies   Allergen Reactions    Adhesive Tape Rash    Motrin [Ibuprofen] Other (comments)     Joint stiffness    Percocet [Oxycodone-Acetaminophen] Hives       Current Medications:  Current Outpatient Prescriptions   Medication Sig    losartan (COZAAR) 50 mg tablet Take 1 Tab by mouth daily.     simvastatin (ZOCOR) 20 mg tablet TAKE 1 TABLET BY MOUTH NIGHTLY    potassium chloride (K-DUR, KLOR-CON) 20 mEq tablet TAKE 1 TABLET BY MOUTH DAILY    nabumetone (RELAFEN) 500 mg tablet TAKE 1 TABLET BY MOUTH TWICE DAILY AS NEEDED FOR PAIN    hydroCHLOROthiazide (HYDRODIURIL) 25 mg tablet TAKE 1 TABLET DAILY    memantine (NAMENDA) 10 mg tablet Take  by mouth two (2) times a day.  rivastigmine (EXELON) 9.5 mg/24 hr patch 1 Patch by TransDERmal route daily. Per neurology    DOCOSAHEXANOIC ACID/EPA (FISH OIL PO) Take  by mouth.  cholecalciferol (VITAMIN D3) 1,000 unit tablet Take  by mouth daily.  garlic 1,546 mg Cap Take 1 Cap by mouth daily.  predniSONE (STERAPRED DS) 10 mg dose pack Take 1 Tab by mouth See Admin Instructions. See administration instruction per 10mg dose pack    traMADol (ULTRAM) 50 mg tablet Take 1 Tab by mouth every six (6) hours as needed for Pain. Max Daily Amount: 200 mg.  hydroCHLOROthiazide (HYDRODIURIL) 25 mg tablet TAKE 1 TABLET BY MOUTH DAILY    metoprolol tartrate (LOPRESSOR) 100 mg IR tablet TAKE 1 TABLET BY MOUTH TWICE DAILY     Current Facility-Administered Medications   Medication Dose Route Frequency    0.9% sodium chloride infusion  25 mL/hr IntraVENous CONTINUOUS    fentaNYL citrate (PF) injection 100 mcg  100 mcg IntraVENous Multiple    midazolam (VERSED) injection 5 mg  5 mg IntraVENous Multiple        Physical Exam:  Blood pressure 138/58, pulse (!) 53, temperature 97.7 °F (36.5 °C), resp. rate 11, height 5' 2\" (1.575 m), weight 62.6 kg (138 lb), SpO2 100 %. GENERAL: alert, cooperative, no distress, appears stated age, LUNG: clear to auscultation bilaterally, HEART: regular rate and rhythm      Alerts:    Hospital Problems  Date Reviewed: 7/5/2018    None          Laboratory:      Recent Labs      08/13/18   0944   HGB  12.2   HCT  37.8   WBC  8.3   PLT  177         Plan of Care/Planned Procedure:  Risks, benefits, and alternatives reviewed with patient and she agrees to proceed with the procedure.

## 2018-08-13 NOTE — DISCHARGE INSTRUCTIONS
BONE MARROW BIOPSY/ASPIRATION DISCHARGE INSTRUCTIONS  A bone marrow aspiration is a procedure that takes out a small amount of bone marrow fluid through a needle. A bone marrow biopsy uses a needle to take out a small amount of bone with the marrow inside it. These samples are then checked under a microscope. The hip bone is the most commonly used area for these procedures. Home Care Instructions: You may resume your regular diet and medication regimen. Do not drink alcohol, drive, or make any important legal decisions in the next 24 hours. Do not lift anything heavier than a gallon of milk until the soreness goes away. You may use over the counter acetaminophen or ibuprofen for the soreness. You may apply an ice pack to the affected area for 20-30 minutes at time for the first 24 hours. After that, you may apply a heat pack. You will have a bandaid over the area where the doctor put the needle. Keep this area clean and dry for the next 24 hours. Change the bandaid daily, or if it becomes wet, until the area has healed. Call if you have any bleeding other than a small spot on your bandaid. Call if you have any signs or symptoms of infection: fever, redness, or drainage from the puncture site or fever. Should you experience any significant changes, please call [phone] between the hours of 7:30 am and 10 pm or [phone] after hours.  After hours, ask the  to page the [address] [place] [address] Technologist, and describe the problem to the technologist.

## 2018-08-14 ENCOUNTER — OFFICE VISIT (OUTPATIENT)
Dept: INTERNAL MEDICINE CLINIC | Age: 68
End: 2018-08-14

## 2018-08-14 VITALS
DIASTOLIC BLOOD PRESSURE: 72 MMHG | OXYGEN SATURATION: 98 % | HEIGHT: 62 IN | TEMPERATURE: 97.6 F | HEART RATE: 60 BPM | SYSTOLIC BLOOD PRESSURE: 126 MMHG | WEIGHT: 136 LBS | BODY MASS INDEX: 25.03 KG/M2

## 2018-08-14 DIAGNOSIS — Z00.00 MEDICARE ANNUAL WELLNESS VISIT, SUBSEQUENT: Primary | ICD-10-CM

## 2018-08-14 DIAGNOSIS — Z23 NEED FOR SHINGLES VACCINE: ICD-10-CM

## 2018-08-14 DIAGNOSIS — I10 ESSENTIAL HYPERTENSION: ICD-10-CM

## 2018-08-14 DIAGNOSIS — Z23 ENCOUNTER FOR IMMUNIZATION: ICD-10-CM

## 2018-08-14 DIAGNOSIS — R41.3 MEMORY LOSS: ICD-10-CM

## 2018-08-14 DIAGNOSIS — M19.90 ARTHRITIS: ICD-10-CM

## 2018-08-14 DIAGNOSIS — E78.2 MIXED HYPERLIPIDEMIA: ICD-10-CM

## 2018-08-14 DIAGNOSIS — Z13.39 SCREENING FOR ALCOHOLISM: ICD-10-CM

## 2018-08-14 DIAGNOSIS — Z71.89 ADVANCED CARE PLANNING/COUNSELING DISCUSSION: ICD-10-CM

## 2018-08-14 DIAGNOSIS — Z13.31 SCREENING FOR DEPRESSION: ICD-10-CM

## 2018-08-14 NOTE — PROGRESS NOTES
HISTORY OF PRESENT ILLNESS  Kaylee Maldonado is a 80 y.o. female. HPI Adela Chambers is seen today for a Medicare Wellness Visit and follow up of chronic problems. Preventive medicine. 1. Please see attached RN note for full details. This was reviewed and discussed with Marilyn. [facility] provides the majority of history for Adela Chambers given her dementia. 2. She is due for the new shingles vaccine and mammogram. Also, see Assessment & Plan. Chronic medical problems are reviewed. 1. Hypertension. Stable on current regimen. 2. Memory loss. Slow progression. She follows with neurology as directed. 3. Hyperlipidemia. Due for lab recheck. 4. Renal insufficiency. Creatinine levels worsening despite cessation of NSAID. I referred her for a nephrology consultation. Workup has yielded abnormal protein levels and she has gone on to see a hematologist. A bone marrow biopsy has been obtained, results are pending. 5. DJD. With Tylenol, symptoms seem fairly good overall. Review of systems. Foot pain. Podiatry consultation is pending. Current Outpatient Prescriptions   Medication Sig    losartan (COZAAR) 50 mg tablet Take 1 Tab by mouth daily.  simvastatin (ZOCOR) 20 mg tablet TAKE 1 TABLET BY MOUTH NIGHTLY    potassium chloride (K-DUR, KLOR-CON) 20 mEq tablet TAKE 1 TABLET BY MOUTH DAILY    metoprolol tartrate (LOPRESSOR) 100 mg IR tablet TAKE 1 TABLET BY MOUTH TWICE DAILY    hydroCHLOROthiazide (HYDRODIURIL) 25 mg tablet TAKE 1 TABLET DAILY    memantine (NAMENDA) 10 mg tablet Take  by mouth two (2) times a day.  rivastigmine (EXELON) 9.5 mg/24 hr patch 1 Patch by TransDERmal route daily. Per neurology    DOCOSAHEXANOIC ACID/EPA (FISH OIL PO) Take  by mouth.  cholecalciferol (VITAMIN D3) 1,000 unit tablet Take  by mouth daily.  garlic 4,794 mg Cap Take 1 Cap by mouth daily.  traMADol (ULTRAM) 50 mg tablet Take 1 Tab by mouth every six (6) hours as needed for Pain.  Max Daily Amount: 200 mg.   [facility] hydroCHLOROthiazide (HYDRODIURIL) 25 mg tablet TAKE 1 TABLET BY MOUTH DAILY    nabumetone (RELAFEN) 500 mg tablet TAKE 1 TABLET BY MOUTH TWICE DAILY AS NEEDED FOR PAIN     No current facility-administered medications for this visit. Review of Systems   Unable to perform ROS: Dementia   Constitutional: Negative for weight loss. Respiratory: Negative. Cardiovascular: Negative for chest pain, palpitations, leg swelling and PND. Musculoskeletal: Positive for joint pain. Negative for myalgias. Neurological: Negative for focal weakness. Psychiatric/Behavioral: Positive for memory loss. Physical Exam   Constitutional: She appears well-nourished. Neck: Carotid bruit is not present. Cardiovascular: Normal rate and regular rhythm. Exam reveals no gallop and no friction rub. No murmur heard. Pulmonary/Chest: Effort normal and breath sounds normal. No respiratory distress. Musculoskeletal: She exhibits edema. Feet:    Mild bilateral lower extremity edema    Nursing note and vitals reviewed. ASSESSMENT and PLAN  Diagnoses and all orders for this visit:    1. Medicare annual wellness visit, subsequent    2. Screening for depression    3. Screening for alcoholism    4. Need for shingles vaccine  -     varicella-zoster recombinant, PF, (SHINGRIX, PF,) 50 mcg/0.5 mL susr injection; 0.5 mL by IntraMUSCular route once for 1 dose. 2nd dose to be given 2-6 months after 1st dose. 5. Advanced care planning/counseling discussion    6. Encounter for immunization    7. Mixed hyperlipidemia  -     LIPID PANEL  -     TSH RFX ON ABNORMAL TO FREE T4  -     HEPATIC FUNCTION PANEL    8. Essential hypertension- Continue current regimen of prescription and / or OTC medications     9. Memory loss- See neurologist as directed     10.  Arthritis- avoid NSAIDS    Plan was reviewed with patient and family, understanding expressed

## 2018-08-14 NOTE — PROGRESS NOTES
Enzo Palafox is a 80 y.o. female and presents for Annual Medicare Wellness Visit. Depression Screen:   PHQ over the last two weeks 1/24/2018   Little interest or pleasure in doing things Not at all   Feeling down, depressed, irritable, or hopeless Not at all   Total Score PHQ 2 0       Fall Risk Assessment:    Fall Risk Assessment, last 12 mths 8/14/2018   Able to walk? Yes   Fall in past 12 months? No       Activities of Daily Living:    ADL Assessment 8/14/2018   Feeding yourself No Help Needed   Getting from bed to chair No Help Needed   Getting dressed No Help Needed   Bathing or showering No Help Needed   Walk across the room (includes cane/walker) No Help Needed   Using the telphone No Help Needed   Taking your medications No Help Needed   Preparing meals No Help Needed   Managing money (expenses/bills) No Help Needed   Moderately strenuous housework (laundry) No Help Needed   Shopping for personal items (toiletries/medicines) No Help Needed   Shopping for groceries No Help Needed   Driving No Help Needed   Climbing a flight of stairs No Help Needed   Getting to places beyond walking distances No Help Needed       Health Maintenance:  Daily Low Dose Aspirin: no  Bone Density: 8/3/16 osteopenic  Glaucoma Screening: yes 2/14/18  Immunizations:    Tetanus: up to date 6/13/11. Influenza: due fall 2018. Shingles:  Zostavax: up to date 2011. Shingrix: order placed Pneumovax:  up to date 7/18/16. Prevnar: up to date 8/4/17. Cancer screening:    Cervical: NA.  Breast: mammogram is scheduled for tomorrow. Colon: up to date 10/4/16 q10 years. Prostate:  NA    Advance Care Planning:   End of Life Planning: has NO advanced directive  - add't info provided. Provided pt with \"Respecting Choices packet of Information\" yes  Offered facilitator session with NN yes     Medications/Allergies: Reviewed with patient  Prior to Admission medications    Medication Sig Start Date End Date Taking?  Authorizing Provider   varicella-zoster recombinant, PF, (SHINGRIX, PF,) 50 mcg/0.5 mL susr injection 0.5 mL by IntraMUSCular route once for 1 dose. 2nd dose to be given 2-6 months after 1st dose. 8/14/18 8/14/18 Yes Mary Dillard MD   losartan (COZAAR) 50 mg tablet Take 1 Tab by mouth daily. 7/20/18  Yes Mary Dillard MD   simvastatin (ZOCOR) 20 mg tablet TAKE 1 TABLET BY MOUTH NIGHTLY 7/10/18  Yes Mary Dillard MD   potassium chloride (K-DUR, KLOR-CON) 20 mEq tablet TAKE 1 TABLET BY MOUTH DAILY 3/30/18  Yes Mary Dillard MD   metoprolol tartrate (LOPRESSOR) 100 mg IR tablet TAKE 1 TABLET BY MOUTH TWICE DAILY 2/22/18  Yes Mary Dillard MD   hydroCHLOROthiazide (HYDRODIURIL) 25 mg tablet TAKE 1 TABLET DAILY 4/24/17  Yes Mary Dillard MD   memantine [facility]) 10 mg tablet Take  by mouth two (2) times a day. Yes Historical Provider   rivastigmine (EXELON) 9.5 mg/24 hr patch 1 Patch by TransDERmal route daily. Per neurology 1/13/16  Yes Mary Dillard MD   [facility] ACID/EPA (FISH OIL PO) Take  by mouth. Yes Historical Provider   cholecalciferol (VITAMIN D3) 1,000 unit tablet Take  by mouth daily. Yes Historical Provider   garlic 4,876 mg Cap Take 1 Cap by mouth daily. 8/5/10  Yes Historical Provider   traMADol (ULTRAM) 50 mg tablet Take 1 Tab by mouth every six (6) hours as needed for Pain.  Max Daily Amount: 200 mg. 6/21/18   Gabriella Saldana MD   hydroCHLOROthiazide (HYDRODIURIL) 25 mg tablet TAKE 1 TABLET BY MOUTH DAILY 6/11/18   Mary Dillard MD   nabumetone (RELAFEN) 500 mg tablet TAKE 1 TABLET BY MOUTH TWICE DAILY AS NEEDED FOR PAIN 8/9/17   Mary Dillard MD     Allergies   Allergen Reactions    Adhesive Tape Rash    Motrin [Ibuprofen] Other (comments)     Joint stiffness    Percocet [Oxycodone-Acetaminophen] Hives       PSH: Reviewed with patient  Past Surgical History:   Procedure Laterality Date    ENDOSCOPY, COLON, DIAGNOSTIC      16, due 26  HX HYSTERECTOMY      HX ORTHOPAEDIC  '07    RT. SHOULDER    HX ORTHOPAEDIC  6/08    RT. TKR        SH: Reviewed with patient  Social History   Substance Use Topics    Smoking status: Never Smoker    Smokeless tobacco: Never Used    Alcohol use 0.0 oz/week     0 Standard drinks or equivalent per week      Comment: 1 PER DAY rare       FH: Reviewed with patient  Family History   Problem Relation Age of Onset    Hypertension Mother     Stroke Mother     Diabetes Father     Hypertension Father     Alcohol abuse Neg Hx     Arthritis-osteo Neg Hx     Asthma Neg Hx     Bleeding Prob Neg Hx     Cancer Neg Hx     Elevated Lipids Neg Hx     Headache Neg Hx     Heart Disease Neg Hx     Lung Disease Neg Hx     Migraines Neg Hx     Psychiatric Disorder Neg Hx     Mental Retardation Neg Hx          Objective:  Visit Vitals    /72    Pulse 60    Temp 97.6 °F (36.4 °C) (Oral)    Ht 5' 2\" (1.575 m)    Wt 136 lb (61.7 kg)    SpO2 98%    BMI 24.87 kg/m2    Body mass index is 24.87 kg/(m^2). Alcohol Risk Screen:   On any occasion during past 3 months, have you had more than 3 drinks (female) or 4 drinks (male) containing alcohol? No  Do you average more than 7 drinks (female) or 14 drinks (male) per week? No  Type and Amount: 3 drinks a week    Tobacco Abuse:  No    Nutrition Screen:  eats a balanced diet    Hearing Loss:  denies any hearing loss    Vision Loss:   Wears glasses, contact lenses, or have any other visual impairment  Wears glasses    Activities of Daily Living:  Self-care.    Requires assistance with: no ADLs  Patient handle his/her own medications  Yes with  Use of pill box  yes        Current medical providers:    Patient Care Team:  Sharon Romo MD as PCP - General      Plan:      Orders Placed This Encounter    LIPID PANEL    TSH RFX ON ABNORMAL TO FREE T4    HEPATIC FUNCTION PANEL    varicella-zoster recombinant, PF, (SHINGRIX, PF,) 50 mcg/0.5 mL susr injection       Health Maintenance   Topic Date Due    Influenza Age 5 to Adult  08/01/2018    BREAST CANCER SCRN MAMMOGRAM  08/09/2019    MEDICARE YEARLY EXAM  08/15/2019    GLAUCOMA SCREENING Q2Y  02/14/2020    DTaP/Tdap/Td series (2 - Td) 06/13/2021    COLONOSCOPY  10/04/2026    Hepatitis C Screening  Completed    Bone Densitometry (Dexa) Screening  Completed    ZOSTER VACCINE AGE 60>  Completed    Pneumococcal 65+ Low/Medium Risk  Completed       *Patient verbalized understanding and agreement with the plan. A copy of the After Visit Summary with personalized health plan was given to the patient today. Physical Exam will be performed by PCP and documented under a separate Progress Note.

## 2018-08-14 NOTE — MR AVS SNAPSHOT
[facility] [address] [place] [address] 
[phone] Patient: Mark Moore MRN:  [id number] Visit Information Date & Time Provider Department Dept. Phone Encounter #  
 8/14/2018  1:20 PM Sandee Lima, [address] [place] [address] Internal Medicine [phone] [id number] Upcoming Health Maintenance Date Due Influenza Age 5 to Adult 8/1/2018 BREAST CANCER SCRN MAMMOGRAM 8/9/2019 MEDICARE YEARLY EXAM 8/15/2019 GLAUCOMA SCREENING Q2Y 2/14/2020 DTaP/Tdap/Td series (2 - Td) 6/13/2021 COLONOSCOPY 10/4/2026 Allergies as of 8/14/2018  Review Complete On: 8/14/2018 By: Tiffani Wright LPN Severity Noted Reaction Type Reactions Adhesive Tape  08/25/2009    Rash Motrin [Ibuprofen]  08/25/2009    Other (comments) Joint stiffness Percocet [Oxycodone-acetaminophen]  08/25/2009    Hives Current Immunizations  Reviewed on 8/10/2017 Name Date Influenza High Dose Vaccine PF 10/26/2016 Pneumococcal Conjugate (PCV-13) 8/4/2017 Pneumococcal Polysaccharide (PPSV-23) 7/18/2016 TDAP Vaccine 6/13/2011  3:16 PM  
 Varicella Virus Vaccine Live 11/1/2011 Zoster Vaccine, Live 11/1/2011 Not reviewed this visit You Were Diagnosed With   
  
 Codes Comments Medicare annual wellness visit, subsequent    -  Primary ICD-10-CM: Z00.00 ICD-9-CM: V70.0 Screening for depression     ICD-10-CM: Z13.89 ICD-9-CM: V79.0 Screening for alcoholism     ICD-10-CM: Z13.89 ICD-9-CM: V79.1 Need for shingles vaccine     ICD-10-CM: M00 ICD-9-CM: V04.89 Advanced care planning/counseling discussion     ICD-10-CM: Z71.89 ICD-9-CM: V65.49 Encounter for immunization     ICD-10-CM: E84 ICD-9-CM: V03.89 Mixed hyperlipidemia     ICD-10-CM: E78.2 ICD-9-CM: 272.2 Essential hypertension     ICD-10-CM: I10 
ICD-9-CM: 401.9 Memory loss     ICD-10-CM: R41.3 ICD-9-CM: 780.93   
 Arthritis     ICD-10-CM: M19.90 ICD-9-CM: 716.90 Vitals BP Pulse Temp Height(growth percentile) Weight(growth percentile) SpO2  
 126/72 60 97.6 °F (36.4 °C) (Oral) 5' 2\" (1.575 m) 136 lb (61.7 kg) 98% BMI OB Status Smoking Status 24.87 kg/m2 Hysterectomy Never Smoker Vitals History BMI and BSA Data Body Mass Index Body Surface Area  
 24.87 kg/m 2 1.64 m 2 Preferred Pharmacy Pharmacy Name Phone [facility] [address] [facility] [address] Ramirez [phone] Your Updated Medication List  
  
   
This list is accurate as of 8/14/18  1:59 PM.  Always use your most recent med list.  
  
  
  
  
 FISH OIL PO Take  by mouth.  
  
 garlic 0,467 mg Cap Take 1 Cap by mouth daily. * hydroCHLOROthiazide 25 mg tablet Commonly known as:  HYDRODIURIL  
TAKE 1 TABLET DAILY * hydroCHLOROthiazide 25 mg tablet Commonly known as:  HYDRODIURIL  
TAKE 1 TABLET BY MOUTH DAILY losartan 50 mg tablet Commonly known as:  COZAAR Take 1 Tab by mouth daily. memantine 10 mg tablet Commonly known as:  Atha Gander Take  by mouth two (2) times a day. metoprolol tartrate 100 mg IR tablet Commonly known as:  LOPRESSOR  
TAKE 1 TABLET BY MOUTH TWICE DAILY  
  
 nabumetone 500 mg tablet Commonly known as:  RELAFEN  
TAKE 1 TABLET BY MOUTH TWICE DAILY AS NEEDED FOR PAIN  
  
 potassium chloride 20 mEq tablet Commonly known as:  K-DUR, KLOR-CON  
TAKE 1 TABLET BY MOUTH DAILY  
  
 rivastigmine 9.5 mg/24 hr patch Commonly known as:  EXELON  
1 Patch by TransDERmal route daily. Per neurology  
  
 simvastatin 20 mg tablet Commonly known as:  ZOCOR  
TAKE 1 TABLET BY MOUTH NIGHTLY  
  
 traMADol 50 mg tablet Commonly known as:  ULTRAM  
Take 1 Tab by mouth every six (6) hours as needed for Pain. Max Daily Amount: 200 mg.  
  
 varicella-zoster recombinant (PF) 50 mcg/0.5 mL Susr injection Commonly known as:  SHINGRIX (PF)  
 0.5 mL by IntraMUSCular route once for 1 dose. 2nd dose to be given 2-6 months after 1st dose. VITAMIN D3 1,000 unit tablet Generic drug:  cholecalciferol Take  by mouth daily. * Notice: This list has 2 medication(s) that are the same as other medications prescribed for you. Read the directions carefully, and ask your doctor or other care provider to review them with you. Prescriptions Printed Refills  
 varicella-zoster recombinant, PF, (SHINGRIX, PF,) 50 mcg/0.5 mL susr injection 1 Si.5 mL by IntraMUSCular route once for 1 dose. 2nd dose to be given 2-6 months after 1st dose. Class: Print Route: IntraMUSCular We Performed the Following HEPATIC FUNCTION PANEL [78519 CPT(R)] LIPID PANEL [72252 CPT(R)] TSH RFX ON ABNORMAL TO FREE T4 [[id number] Custom] To-Do List   
 08/15/2018 12:30 PM  
(Arrive by 12:15 PM) Appointment with Dina WOLFE 1 at [address] [place] [address] ([phone]) Shower or bathe using soap and water. Do not use deodorant, powder, perfumes, or lotion the day of your exam.  If your prior mammograms were not performed at [place] [address] please bring films with you or forward prior images 2 days before your procedure. Check in at registration 15min before your appointment time unless you were instructed to do otherwise. A script is not necessary, but if you have one, please bring it on the day of the mammogram or have it faxed to the department. You are responsible for finding a method of transportation to your appointment. If you don't have transportation, please reschedule your appointment at least 24 hours in advance. [facility] [phone] [address] [place] [address]  [phone] [facility]  [phone] [facility] [phone] [address] [phone] Please arrive 15 minutes prior to appointment to register. Patient Instructions Today you had a Medicare Wellness Visit.   During this visit, we developed and/or updated your personalized health plan to prevent disease and disability based on your current health and risk factors. Please schedule an appt around this time next year so we can continue to keep you on the right path to living a healthy lifestyle. Schedule of Personalized Health Plan The best way to stay healthy is to live a healthy lifestyle. A healthy lifestyle includes regular exercise, eating a well-balanced diet, keeping a healthy weight and not smoking. Regular physical exams and screening tests are another important way to take care of yourself. Preventive exams provided by health care providers can find health problems early when treatment works best and can keep you from getting certain diseases or illnesses. Preventive services include exams, lab tests, screenings, shots, monitoring and information to help you take care of your own health. All people over 65 should have a pneumonia shot. Pneumonia shots are usually only needed once in a lifetime unless your doctor decides differently. All people over 65 should have a yearly flu shot. People over 65 are at medium to high risk for Hepatitis B. Three shots are needed for complete protection. For additional information, please discuss with physician. In addition to your physical exam, some screening tests are recommended: 
 
Bone mass measurement (dexa scan) is recommended every  two years. Diabetes Mellitus screening is recommended every year. Glaucoma is an eye disease caused by high pressure in the eye. An eye exam is recommended every year. Cardiovascular screening tests that check your cholesterol and other blood fat (lipid) levels are recommended every five years. Colorectal Cancer screening tests help to find pre-cancerous polyps (growths in the colon) so they can be removed before they turn into cancer. Tests ordered for screening depend on your personal and family history risk factors. Mammogram screening for Breast Cancer is recommended yearly. Screening for Cervical Cancer is recommended every two years (annually for certain risk factors, such as previous history of STD or abnormal PAP in past 7 years). Here is a list of your current Health Maintenance items with a due date: 
Health Maintenance Topic Date Due  Influenza Age 5 to Adult  08/01/2018  BREAST CANCER SCRN MAMMOGRAM  08/09/2019  MEDICARE YEARLY EXAM  08/15/2019  GLAUCOMA SCREENING Q2Y  02/14/2020  
 DTaP/Tdap/Td series (2 - Td) 06/13/2021  COLONOSCOPY  10/04/2026  Hepatitis C Screening  Completed  Bone Densitometry (Dexa) Screening  Completed  ZOSTER VACCINE AGE 60>  Completed  Pneumococcal 65+ Low/Medium Risk  Completed Introducing [facility] [place] [facility] SERVICES! Dear Lluvia Juarez: Thank you for requesting a [employer] account. Our records indicate that you already have an active [employer] account. You can access your account anytime at [email or web address] Did you know that you can access your hospital and ER discharge instructions at any time in [employer]? You can also review all of your test results from your hospital stay or ER visit. Additional Information If you have questions, please visit the Frequently Asked Questions section of the [employer] website at [email or web address] Remember, [employer] is NOT to be used for urgent needs. For medical emergencies, dial 911. Now available from your iPhone and Android! Please provide this summary of care documentation to your next provider. Your primary care clinician is listed as MAHSA ROSEN. If you have any questions after today's visit, please call [phone].

## 2018-08-14 NOTE — PATIENT INSTRUCTIONS
Today you had a Medicare Wellness Visit. During this visit, we developed and/or updated your personalized health plan to prevent disease and disability based on your current health and risk factors. Please schedule an appt around this time next year so we can continue to keep you on the right path to living a healthy lifestyle. Schedule of Personalized Health Plan    The best way to stay healthy is to live a healthy lifestyle. A healthy lifestyle includes regular exercise, eating a well-balanced diet, keeping a healthy weight and not smoking. Regular physical exams and screening tests are another important way to take care of yourself. Preventive exams provided by health care providers can find health problems early when treatment works best and can keep you from getting certain diseases or illnesses. Preventive services include exams, lab tests, screenings, shots, monitoring and information to help you take care of your own health. All people over 65 should have a pneumonia shot. Pneumonia shots are usually only needed once in a lifetime unless your doctor decides differently. All people over 65 should have a yearly flu shot. People over 65 are at medium to high risk for Hepatitis B. Three shots are needed for complete protection. For additional information, please discuss with physician. In addition to your physical exam, some screening tests are recommended:    Bone mass measurement (dexa scan) is recommended every  two years. Diabetes Mellitus screening is recommended every year. Glaucoma is an eye disease caused by high pressure in the eye. An eye exam is recommended every year. Cardiovascular screening tests that check your cholesterol and other blood fat (lipid) levels are recommended every five years. Colorectal Cancer screening tests help to find pre-cancerous polyps (growths in the colon) so they can be removed before they turn into cancer.   Tests ordered for screening depend on your personal and family history risk factors. Mammogram screening for Breast Cancer is recommended yearly. Screening for Cervical Cancer is recommended every two years (annually for certain risk factors, such as previous history of STD or abnormal PAP in past 7 years).     Here is a list of your current Health Maintenance items with a due date:  Health Maintenance   Topic Date Due    Influenza Age 5 to Adult  08/01/2018   [address] [place] [address] BREAST CANCER SCRN MAMMOGRAM  08/09/2019    MEDICARE YEARLY EXAM  08/15/2019    GLAUCOMA SCREENING Q2Y  02/14/2020    DTaP/Tdap/Td series (2 - Td) 06/13/2021    COLONOSCOPY  10/04/2026    Hepatitis C Screening  Completed    Bone Densitometry (Dexa) Screening  Completed    ZOSTER VACCINE AGE 60>  Completed    Pneumococcal 65+ Low/Medium Risk  Completed

## 2018-08-15 ENCOUNTER — HOSPITAL ENCOUNTER (OUTPATIENT)
Dept: MAMMOGRAPHY | Age: 68
Discharge: HOME | End: 2018-08-15
Attending: INTERNAL MEDICINE
Payer: MEDICARE

## 2018-08-15 DIAGNOSIS — Z12.31 VISIT FOR SCREENING MAMMOGRAM: ICD-10-CM

## 2018-08-15 PROCEDURE — 77067 SCR MAMMO BI INCL CAD: CPT

## 2018-08-22 DIAGNOSIS — I10 ESSENTIAL HYPERTENSION: ICD-10-CM

## 2018-08-23 RX ORDER — METOPROLOL TARTRATE 100 MG/1
TABLET ORAL
Qty: 180 TAB | Refills: 3 | Status: SHIPPED | OUTPATIENT
Start: 2018-08-23 | End: 2019-08-20

## 2018-08-24 NOTE — PROGRESS NOTES
8/20/18 - patient  called to report patient has a rash on right hip. Reported to Dr. Yareli Chaudhary. Dr. Yareli Chaudhary ordered patient to apply over the counter hydrocortisone cream and if did not improve to have patient go to primary care physician. Patient's  verbalized understanding. 8/24/18 -  reports that rash is improving and that the hydrocortisone cream is working.  verbalized will call primary care physician if does not  Continue to improve.

## 2018-09-05 ENCOUNTER — HOSPITAL ENCOUNTER (OUTPATIENT)
Dept: LAB | Age: 68
Discharge: HOME | End: 2018-09-05
Payer: MEDICARE

## 2018-09-05 PROCEDURE — 36415 COLL VENOUS BLD VENIPUNCTURE: CPT

## 2018-09-05 PROCEDURE — 84443 ASSAY THYROID STIM HORMONE: CPT

## 2018-09-05 PROCEDURE — 80076 HEPATIC FUNCTION PANEL: CPT

## 2018-09-05 PROCEDURE — 80061 LIPID PANEL: CPT

## 2018-09-06 LAB
ALBUMIN SERPL-MCNC: 4.3 G/DL (ref 3.6–4.8)
ALP SERPL-CCNC: 73 IU/L (ref 39–117)
ALT SERPL-CCNC: 22 IU/L (ref 0–32)
AST SERPL-CCNC: 30 IU/L (ref 0–40)
BILIRUB DIRECT SERPL-MCNC: 0.09 MG/DL (ref 0–0.4)
BILIRUB SERPL-MCNC: 0.3 MG/DL (ref 0–1.2)
CHOLEST SERPL-MCNC: 163 MG/DL (ref 100–199)
HDLC SERPL-MCNC: 91 MG/DL
LDLC SERPL CALC-MCNC: 60 MG/DL (ref 0–99)
PROT SERPL-MCNC: 6.7 G/DL (ref 6–8.5)
TRIGL SERPL-MCNC: 61 MG/DL (ref 0–149)
TSH SERPL DL<=0.005 MIU/L-ACNC: 0.97 UIU/ML (ref 0.45–4.5)
VLDLC SERPL CALC-MCNC: 12 MG/DL (ref 5–40)

## 2018-10-13 ENCOUNTER — PATIENT MESSAGE (OUTPATIENT)
Dept: INTERNAL MEDICINE CLINIC | Age: 68
End: 2018-10-13

## 2018-10-15 NOTE — TELEPHONE ENCOUNTER
From: Elfego Lemus  To: Gregoria Cohn MD  Sent: 10/13/2018 4:29 PM EDT  Subject: Update Medical Information    Hello Doctor,    1. Had the Flu vaccine shot administered on 10-10-18. 2. Had part one (1) of the Shingles vaccine shot administered on 10-10-18.       Thanks and have a nice day,    [employer]

## 2018-11-07 ENCOUNTER — HOSPITAL ENCOUNTER (OUTPATIENT)
Dept: GENERAL RADIOLOGY | Age: 68
Discharge: HOME | End: 2018-11-07
Payer: MEDICARE

## 2018-11-07 ENCOUNTER — OFFICE VISIT (OUTPATIENT)
Dept: INTERNAL MEDICINE CLINIC | Age: 68
End: 2018-11-07

## 2018-11-07 VITALS
WEIGHT: 135 LBS | BODY MASS INDEX: 24.84 KG/M2 | DIASTOLIC BLOOD PRESSURE: 80 MMHG | SYSTOLIC BLOOD PRESSURE: 120 MMHG | RESPIRATION RATE: 16 BRPM | OXYGEN SATURATION: 98 % | HEART RATE: 60 BPM | HEIGHT: 62 IN | TEMPERATURE: 97.8 F

## 2018-11-07 DIAGNOSIS — M10.071 ACUTE IDIOPATHIC GOUT OF RIGHT FOOT: Primary | ICD-10-CM

## 2018-11-07 DIAGNOSIS — M10.071 ACUTE IDIOPATHIC GOUT OF RIGHT FOOT: ICD-10-CM

## 2018-11-07 DIAGNOSIS — M79.671 RIGHT FOOT PAIN: ICD-10-CM

## 2018-11-07 PROCEDURE — 73630 X-RAY EXAM OF FOOT: CPT

## 2018-11-07 RX ORDER — PREDNISONE 10 MG/1
TABLET ORAL
Qty: 25 TAB | Refills: 0 | Status: SHIPPED | OUTPATIENT
Start: 2018-11-07 | End: 2019-02-12 | Stop reason: ALTCHOICE

## 2018-11-07 RX ORDER — ALLOPURINOL 100 MG/1
100 TABLET ORAL DAILY
Qty: 30 TAB | Refills: 11 | Status: SHIPPED | OUTPATIENT
Start: 2018-11-07 | End: 2019-02-12 | Stop reason: SDUPTHER

## 2018-11-07 NOTE — PROGRESS NOTES
HISTORY OF PRESENT ILLNESS  Robyn Bee is a 80 y.o. female. Foot Pain   The history is provided by the patient and spouse. This is a recurrent problem. Episode onset: intermittent x 7 months, latest flare one month. The problem occurs constantly. The problem has not changed since onset. The symptoms are aggravated by standing and walking. The symptoms are relieved by medications (previously prednisone helped). Swelling         Review of Systems   Constitutional: Negative for chills and fever. Musculoskeletal: Positive for joint pain. Skin: Negative for rash. Physical Exam   Cardiovascular:   Pulses:       Dorsalis pedis pulses are 2+ on the right side, and 2+ on the left side. Musculoskeletal:        Right foot: There is decreased range of motion, bony tenderness and swelling. There is no deformity. Left foot: Normal.        Feet:    Neurological: She is alert. Skin: Skin is warm and dry. Psychiatric: She has a normal mood and affect. Her behavior is normal.   Nursing note and vitals reviewed. ASSESSMENT and PLAN  Diagnoses and all orders for this visit:    1. Acute idiopathic gout of right foot  -     predniSONE (DELTASONE) 10 mg tablet; See attached  -     allopurinol (ZYLOPRIM) 100 mg tablet; Take 1 Tab by mouth daily. -     XR FOOT RT MIN 3 V; Future    2. Right foot pain  -     predniSONE (DELTASONE) 10 mg tablet; See attached  -     allopurinol (ZYLOPRIM) 100 mg tablet; Take 1 Tab by mouth daily.   -     XR FOOT RT MIN 3 V; Future

## 2018-11-07 NOTE — PATIENT INSTRUCTIONS
Purine-Restricted Diet: Care Instructions  Your Care Instructions    Purines are substances that are found in some foods. Your body turns purines into uric acid. High levels of uric acid can cause gout, which is a form of arthritis that causes pain and inflammation in joints. You may be able to help control the amount of uric acid in your body by limiting high-purine foods in your diet. Follow-up care is a key part of your treatment and safety. Be sure to make and go to all appointments, and call your doctor if you are having problems. It's also a good idea to know your test results and keep a list of the medicines you take. How can you care for yourself at home? · Plan your meals and snacks around foods that are low in purines and are safe for you to eat. These foods include:  ? Green vegetables and tomatoes. ? Fruits. ? Whole-grain breads, rice, and cereals. ? Eggs, peanut butter, and nuts. ? Low-fat milk, cheese, and other milk products. ? Popcorn. ? Gelatin desserts, chocolate, cocoa, and cakes and sweets, in small amounts. · You can eat certain foods that are medium-high in purines, but eat them only once in a while. These foods include:  ? Legumes, such as dried beans and dried peas. You can have 1 cup cooked legumes each day. ? Asparagus, cauliflower, spinach, mushrooms, and green peas. ? Fish and seafood (other than very high-purine seafood). ? Oatmeal, wheat bran, and wheat germ. · Limit very high-purine foods, including:  ? Organ meats, such as liver, kidneys, sweetbreads, and brains. ? Meats, including sky, beef, pork, and lamb. ? Game meats and any other meats in large amounts. ? Anchovies, sardines, herring, mackerel, and scallops. ? Gravy. ? Beer. Where can you learn more? Go to http://mirian-vin.info/. Enter F448 in the search box to learn more about \"Purine-Restricted Diet: Care Instructions. \"  Current as of: March 29, 2018  Content Version: 11.8  © 9562-9547 [employer]. Care instructions adapted under license by [email or web address] (which disclaims liability or warranty for this information). If you have questions about a medical condition or this instruction, always ask your healthcare professional. [address] any warranty or liability for your use of this information. Gout: Care Instructions  Your Care Instructions    Gout is a form of arthritis caused by a buildup of uric acid crystals in a joint. It causes sudden attacks of pain, swelling, redness, and stiffness, usually in one joint, especially the big toe. Gout usually comes on without a cause. But it can be brought on by drinking alcohol (especially beer) or eating seafood and red meat. Taking certain medicines, such as diuretics or aspirin, also can bring on an attack of gout. Taking your medicines as prescribed and following up with your doctor regularly can help you avoid gout attacks in the future. Follow-up care is a key part of your treatment and safety. Be sure to make and go to all appointments, and call your doctor if you are having problems. It's also a good idea to know your test results and keep a list of the medicines you take. How can you care for yourself at home? · If the joint is swollen, put ice or a cold pack on the area for 10 to 20 minutes at a time. Put a thin cloth between the ice and your skin. · Prop up the sore limb on a pillow when you ice it or anytime you sit or lie down during the next 3 days. Try to keep it above the level of your heart. This will help reduce swelling. · Rest sore joints. Avoid activities that put weight or strain on the joints for a few days. Take short rest breaks from your regular activities during the day. · Take your medicines exactly as prescribed. Call your doctor if you think you are having a problem with your medicine. · Take pain medicines exactly as directed.   ? If the doctor gave you a prescription medicine for pain, take it as prescribed. ? If you are not taking a prescription pain medicine, ask your doctor if you can take an over-the-counter medicine. · Eat less seafood and red meat. · Check with your doctor before drinking alcohol. · Losing weight, if you are overweight, may help reduce attacks of gout. But do not go on a [employer]. \" Losing a lot of weight in a short amount of time can cause a gout attack. When should you call for help? Call your doctor now or seek immediate medical care if:    · You have a fever.     · The joint is so painful you cannot use it.     · You have sudden, unexplained swelling, redness, warmth, or severe pain in one or more joints.    Watch closely for changes in your health, and be sure to contact your doctor if:    · You have joint pain.     · Your symptoms get worse or are not improving after 2 or 3 days. Where can you learn more? Go to http://mirian-vin.info/. Enter J378 in the search box to learn more about \"Gout: Care Instructions. \"  Current as of: June 11, 2018  Content Version: 11.8  © 1404-6742 [employer]. Care instructions adapted under license by [employer] (which disclaims liability or warranty for this information). If you have questions about a medical condition or this instruction, always ask your healthcare professional. [address] any warranty or liability for your use of this information.

## 2019-01-07 RX ORDER — HYDROCHLOROTHIAZIDE 25 MG/1
TABLET ORAL
Qty: 90 TAB | Refills: 3 | Status: SHIPPED | OUTPATIENT
Start: 2019-01-07 | End: 2019-02-12 | Stop reason: SINTOL

## 2019-02-12 ENCOUNTER — OFFICE VISIT (OUTPATIENT)
Dept: INTERNAL MEDICINE CLINIC | Age: 69
End: 2019-02-12

## 2019-02-12 VITALS
RESPIRATION RATE: 18 BRPM | SYSTOLIC BLOOD PRESSURE: 138 MMHG | OXYGEN SATURATION: 99 % | BODY MASS INDEX: 24.84 KG/M2 | HEART RATE: 56 BPM | WEIGHT: 135 LBS | TEMPERATURE: 97.6 F | DIASTOLIC BLOOD PRESSURE: 84 MMHG | HEIGHT: 62 IN

## 2019-02-12 DIAGNOSIS — E78.2 MIXED HYPERLIPIDEMIA: Primary | ICD-10-CM

## 2019-02-12 DIAGNOSIS — M79.671 RIGHT FOOT PAIN: ICD-10-CM

## 2019-02-12 DIAGNOSIS — B96.89 ACUTE BACTERIAL BRONCHITIS: ICD-10-CM

## 2019-02-12 DIAGNOSIS — R41.3 MEMORY LOSS: ICD-10-CM

## 2019-02-12 DIAGNOSIS — I10 ESSENTIAL HYPERTENSION: ICD-10-CM

## 2019-02-12 DIAGNOSIS — M19.90 ARTHRITIS: ICD-10-CM

## 2019-02-12 DIAGNOSIS — J20.8 ACUTE BACTERIAL BRONCHITIS: ICD-10-CM

## 2019-02-12 DIAGNOSIS — R73.01 IFG (IMPAIRED FASTING GLUCOSE): ICD-10-CM

## 2019-02-12 DIAGNOSIS — M10.071 ACUTE IDIOPATHIC GOUT OF RIGHT FOOT: ICD-10-CM

## 2019-02-12 RX ORDER — AZITHROMYCIN 250 MG/1
TABLET, FILM COATED ORAL
Qty: 6 TAB | Refills: 0 | Status: SHIPPED | OUTPATIENT
Start: 2019-02-12 | End: 2019-02-13 | Stop reason: SDUPTHER

## 2019-02-12 RX ORDER — ALLOPURINOL 100 MG/1
100 TABLET ORAL DAILY
Qty: 90 TAB | Refills: 11 | Status: SHIPPED | COMMUNITY
Start: 2019-02-12 | End: 2019-12-12 | Stop reason: SDUPTHER

## 2019-02-12 NOTE — PROGRESS NOTES
HISTORY OF PRESENT ILLNESS Juan Garcia is a 80 y.o. female. HPI Subjective:  Iker Rivas is seen today accompanied by her , Kieran Díaz, for follow up of hypertension and other problems. He helps with the history given her memory loss. 1. Hypertension, stable on current regimen. 2. Hyperlipidemia. Due for routine labs. 3. Memory loss. Up to date with neurology follow up and in general stable. 4. Gout, no recurrence. She is on Allopurinol. 5. CKD, MGUS. Up to date with specialist follow up. 6. Preventive medicine. Medicare wellness visit six months. Review of Systems:  Notable for a URI for three weeks. Her ears are full and she has discomfort. She denies sputum production, fevers and chills, however the cough sounds wet. Treatment so far has included Nyquil and Dayquil. Reviewed with him the likely need for antibiotic for a bronchitis. Will let me know if her symptoms persist. 
 
Past Medical History:  
Diagnosis Date  Arthritis KNEE PAIN  
 Diverticulosis  Hypercholesteremia  Hypertension  Unspecified sleep apnea  Vitamin B 12 deficiency  Zoster Review of Systems Constitutional: Negative for chills, fever and weight loss. HENT: Positive for ear pain. Respiratory: Negative. Cardiovascular: Positive for chest pain. Negative for palpitations, leg swelling and PND. With coughing Musculoskeletal: Negative for myalgias. Neurological: Negative for focal weakness. Physical Exam  
Constitutional: She appears well-nourished. HENT:  
Right Ear: Tympanic membrane and ear canal normal.  
Left Ear: Tympanic membrane and ear canal normal.  
Mouth/Throat: Oropharynx is clear and moist. No oropharyngeal exudate. Eyes: Conjunctivae are normal. Right eye exhibits no discharge. Left eye exhibits no discharge. Neck: Neck supple. Carotid bruit is not present. Cardiovascular: Normal rate and regular rhythm.  Exam reveals no gallop and no friction rub. No murmur heard. Pulmonary/Chest: Effort normal and breath sounds normal. No respiratory distress. She has no wheezes. She has no rales. Musculoskeletal: She exhibits no edema. Lymphadenopathy:  
  She has no cervical adenopathy. Nursing note and vitals reviewed. ASSESSMENT and PLAN Diagnoses and all orders for this visit: 
 
1. Mixed hyperlipidemia -     LIPID PANEL 
-     HEPATIC FUNCTION PANEL 2. Acute idiopathic gout of right foot 
-     URIC ACID 
-     allopurinol (ZYLOPRIM) 100 mg tablet; Take 1 Tab by mouth daily. 3. Essential hypertension- Continue current regimen of prescription and / or OTC medications 4. Memory loss- See neurologist as directed 5. Arthritis 
-     REFERRAL TO ORTHOPEDIC SURGERY 6. Acute bacterial bronchitis- see med list 
 
7. Right foot pain 
-     allopurinol (ZYLOPRIM) 100 mg tablet; Take 1 Tab by mouth daily. 8. IFG (impaired fasting glucose) -     GLUCOSE, RANDOM Plan was reviewed with patient and family, understanding expressed

## 2019-02-13 ENCOUNTER — TELEPHONE (OUTPATIENT)
Dept: INTERNAL MEDICINE CLINIC | Age: 69
End: 2019-02-13

## 2019-02-13 DIAGNOSIS — J20.8 ACUTE BACTERIAL BRONCHITIS: ICD-10-CM

## 2019-02-13 DIAGNOSIS — B96.89 ACUTE BACTERIAL BRONCHITIS: ICD-10-CM

## 2019-02-13 RX ORDER — AZITHROMYCIN 250 MG/1
TABLET, FILM COATED ORAL
Qty: 6 TAB | Refills: 0 | Status: SHIPPED | OUTPATIENT
Start: 2019-02-13 | End: 2019-02-18

## 2019-03-13 ENCOUNTER — HOSPITAL ENCOUNTER (OUTPATIENT)
Dept: LAB | Age: 69
Discharge: HOME | End: 2019-03-13
Payer: MEDICARE

## 2019-03-13 PROCEDURE — 80061 LIPID PANEL: CPT

## 2019-03-13 PROCEDURE — 80076 HEPATIC FUNCTION PANEL: CPT

## 2019-03-13 PROCEDURE — 82947 ASSAY GLUCOSE BLOOD QUANT: CPT

## 2019-03-13 PROCEDURE — 36415 COLL VENOUS BLD VENIPUNCTURE: CPT

## 2019-03-13 PROCEDURE — 84550 ASSAY OF BLOOD/URIC ACID: CPT

## 2019-03-14 LAB
ALBUMIN SERPL-MCNC: 4 G/DL (ref 3.6–4.8)
ALP SERPL-CCNC: 67 IU/L (ref 39–117)
ALT SERPL-CCNC: 21 IU/L (ref 0–32)
AST SERPL-CCNC: 27 IU/L (ref 0–40)
BILIRUB DIRECT SERPL-MCNC: 0.13 MG/DL (ref 0–0.4)
BILIRUB SERPL-MCNC: 0.3 MG/DL (ref 0–1.2)
CHOLEST SERPL-MCNC: 171 MG/DL (ref 100–199)
GLUCOSE P FAST SERPL-MCNC: 71 MG/DL (ref 65–99)
HDLC SERPL-MCNC: 99 MG/DL
LDLC SERPL CALC-MCNC: 60 MG/DL (ref 0–99)
PROT SERPL-MCNC: 6.5 G/DL (ref 6–8.5)
TRIGL SERPL-MCNC: 62 MG/DL (ref 0–149)
URATE SERPL-MCNC: 5.6 MG/DL (ref 2.5–7.1)
VLDLC SERPL CALC-MCNC: 12 MG/DL (ref 5–40)

## 2019-03-30 DIAGNOSIS — E78.2 MIXED HYPERLIPIDEMIA: ICD-10-CM

## 2019-03-30 RX ORDER — SIMVASTATIN 20 MG/1
TABLET, FILM COATED ORAL
Qty: 90 TAB | Refills: 3 | Status: SHIPPED | OUTPATIENT
Start: 2019-03-30 | End: 2019-12-12 | Stop reason: SDUPTHER

## 2019-04-10 RX ORDER — POTASSIUM CHLORIDE 20 MEQ/1
TABLET, EXTENDED RELEASE ORAL
Qty: 90 TAB | Refills: 3 | Status: SHIPPED | OUTPATIENT
Start: 2019-04-10 | End: 2019-12-12 | Stop reason: SDUPTHER

## 2019-06-26 RX ORDER — LOSARTAN POTASSIUM 50 MG/1
TABLET ORAL
Qty: 90 TAB | Refills: 3 | Status: SHIPPED | OUTPATIENT
Start: 2019-06-26 | End: 2019-12-12 | Stop reason: SDUPTHER

## 2019-08-20 ENCOUNTER — OFFICE VISIT (OUTPATIENT)
Dept: INTERNAL MEDICINE CLINIC | Age: 69
End: 2019-08-20

## 2019-08-20 VITALS
BODY MASS INDEX: 24.29 KG/M2 | OXYGEN SATURATION: 99 % | WEIGHT: 132 LBS | DIASTOLIC BLOOD PRESSURE: 66 MMHG | SYSTOLIC BLOOD PRESSURE: 99 MMHG | HEART RATE: 60 BPM | HEIGHT: 62 IN | TEMPERATURE: 97.8 F | RESPIRATION RATE: 12 BRPM

## 2019-08-20 DIAGNOSIS — M19.90 ARTHRITIS: ICD-10-CM

## 2019-08-20 DIAGNOSIS — M10.071 ACUTE IDIOPATHIC GOUT OF RIGHT FOOT: ICD-10-CM

## 2019-08-20 DIAGNOSIS — R41.3 MEMORY LOSS: ICD-10-CM

## 2019-08-20 DIAGNOSIS — R73.01 IFG (IMPAIRED FASTING GLUCOSE): ICD-10-CM

## 2019-08-20 DIAGNOSIS — E78.2 MIXED HYPERLIPIDEMIA: ICD-10-CM

## 2019-08-20 DIAGNOSIS — Z00.00 MEDICARE ANNUAL WELLNESS VISIT, SUBSEQUENT: Primary | ICD-10-CM

## 2019-08-20 DIAGNOSIS — Z13.31 SCREENING FOR DEPRESSION: ICD-10-CM

## 2019-08-20 DIAGNOSIS — Z23 ENCOUNTER FOR IMMUNIZATION: ICD-10-CM

## 2019-08-20 DIAGNOSIS — I10 ESSENTIAL HYPERTENSION: ICD-10-CM

## 2019-08-20 RX ORDER — METOPROLOL TARTRATE 100 MG/1
50 TABLET ORAL 2 TIMES DAILY
Qty: 1 TAB | Refills: 0
Start: 2019-08-20 | End: 2019-12-12 | Stop reason: SDUPTHER

## 2019-08-20 NOTE — PROGRESS NOTES
This is the Subsequent Medicare Annual Wellness Exam, performed 12 months or more after the Initial AWV or the last Subsequent AWV    I have reviewed the patient's medical history in detail and updated the computerized patient record. History     Past Medical History:   Diagnosis Date    Arthritis     KNEE PAIN    Diverticulosis     Hypercholesteremia     Hypertension     Unspecified sleep apnea     Vitamin B 12 deficiency     Zoster       Past Surgical History:   Procedure Laterality Date    ENDOSCOPY, COLON, DIAGNOSTIC      16, due 32    HX HYSTERECTOMY      HX ORTHOPAEDIC  '07    RT. SHOULDER    HX ORTHOPAEDIC  6/08    RT. TKR     Current Outpatient Medications   Medication Sig Dispense Refill    losartan (COZAAR) 50 mg tablet TAKE 1 TABLET BY MOUTH EVERY DAY (REPLACES VALSARTAN) 90 Tab 3    potassium chloride (K-DUR, KLOR-CON) 20 mEq tablet TAKE 1 TABLET BY MOUTH DAILY 90 Tab 3    simvastatin (ZOCOR) 20 mg tablet TAKE 1 TABLET BY MOUTH NIGHTLY 90 Tab 3    allopurinol (ZYLOPRIM) 100 mg tablet Take 1 Tab by mouth daily. 90 Tab 11    metoprolol tartrate (LOPRESSOR) 100 mg IR tablet TAKE 1 TABLET BY MOUTH TWICE DAILY (Patient taking differently: Take 100 mg by mouth daily. ) 180 Tab 3    traMADol (ULTRAM) 50 mg tablet Take 1 Tab by mouth every six (6) hours as needed for Pain. Max Daily Amount: 200 mg. 30 Tab 0    hydroCHLOROthiazide (HYDRODIURIL) 25 mg tablet TAKE 1 TABLET DAILY 90 Tab 3    memantine (NAMENDA) 10 mg tablet Take  by mouth two (2) times a day.  rivastigmine (EXELON) 9.5 mg/24 hr patch 1 Patch by TransDERmal route daily. Per neurology 1 Patch 0    DOCOSAHEXANOIC ACID/EPA (FISH OIL PO) Take  by mouth.  cholecalciferol (VITAMIN D3) 1,000 unit tablet Take  by mouth daily.  garlic 4,322 mg Cap Take 1 Cap by mouth daily.        Allergies   Allergen Reactions    Latex, Natural Rubber Rash    Adhesive Tape Rash    Motrin [Ibuprofen] Other (comments)     Joint stiffness    Percocet [Oxycodone-Acetaminophen] Hives     Family History   Problem Relation Age of Onset    Hypertension Mother     Stroke Mother     Diabetes Father     Hypertension Father     Alcohol abuse Neg Hx     Arthritis-osteo Neg Hx     Asthma Neg Hx     Bleeding Prob Neg Hx     Cancer Neg Hx     Elevated Lipids Neg Hx     Headache Neg Hx     Heart Disease Neg Hx     Lung Disease Neg Hx     Migraines Neg Hx     Psychiatric Disorder Neg Hx     Mental Retardation Neg Hx      Social History     Tobacco Use    Smoking status: Never Smoker    Smokeless tobacco: Never Used   Substance Use Topics    Alcohol use: Yes     Alcohol/week: 0.0 standard drinks     Comment: 1 PER DAY rare     Patient Active Problem List   Diagnosis Code    Arthritis M19.90    Hypertension I10    Herpes zoster without mention of complication T17.8    Diverticulosis of colon (without mention of hemorrhage) K57.30    Memory loss R41.3    Unspecified sleep apnea G47.30    Encounter for long-term (current) use of other medications Z79.899    Mixed hyperlipidemia E78.2    Advance directive discussed with patient Z71.89       Depression Risk Factor Screening:     3 most recent PHQ Screens 8/20/2019   Little interest or pleasure in doing things Not at all   Feeling down, depressed, irritable, or hopeless Not at all   Total Score PHQ 2 0     Alcohol Risk Factor Screening: You do not drink alcohol or very rarely. Functional Ability and Level of Safety:   Hearing Loss  Hearing loss is mild. Activities of Daily Living  The home contains: no safety equipment. Patient needs help with:  transportation, managing medications and managing money    Fall Risk  Fall Risk Assessment, last 12 mths 8/20/2019   Able to walk? Yes   Fall in past 12 months?  No       Abuse Screen  Patient is not abused    Cognitive Screening   Evaluation of Cognitive Function:  Has your family/caregiver stated any concerns about your memory: yes  Abnormal    Patient Care Team   Patient Care Team:  Zach Kidd MD as PCP - General    Assessment/Plan   Education and counseling provided:  Are appropriate based on today's review and evaluation    Diagnoses and all orders for this visit:    1. Medicare annual wellness visit, subsequent    2. Mixed hyperlipidemia    3. Acute idiopathic gout of right foot    4. Essential hypertension    5. Memory loss    6. IFG (impaired fasting glucose)    7. Encounter for immunization    8. Arthritis    9.  Screening for depression  -     [place] Maintenance Due   Topic Date Due    Influenza Age 5 to Adult  08/01/2019

## 2019-08-20 NOTE — PROGRESS NOTES
HISTORY OF PRESENT ILLNESS  Kendrick Haider is a 77 y.o. female. HPI Subjective:  Karri Farris is seen today accompanied by her , Yina Hernandez, for a Medicare wellness visit. He provides a lot of the history given her memory problems. 1. Wellness visit. See attached note. She is due for labs. She is up to date with vaccinations, colonoscopy, mammogram and bone density study. 2. Chronic problems are reviewed. a. Hypertension. Blood pressure was low initially, but excellent on recheck. We will go ahead and adjust her dosing on Metoprolol to be 50 mg b.i.d.  I have asked them to send me a [employer] message with her blood pressure in one week. b. Hyperlipidemia. Due for a lab check. c. Osteoarthritis, stable overall. She is staying away from NSAIDs given her kidney troubles. d. CKD. She is following up with nephrology. e. Dementia. No real changes. Symptoms are fairly mild and she is very functional.  Her , Laura Pathak, is very supportive and assists with all facets of her care. f. Gout, one flare-up only, otherwise stable. Past Medical History:   Diagnosis Date    Arthritis     KNEE PAIN    Diverticulosis     Hypercholesteremia     Hypertension     Unspecified sleep apnea     Vitamin B 12 deficiency     Zoster          Review of Systems   Constitutional: Negative for weight loss. Respiratory: Negative. Cardiovascular: Negative for chest pain, palpitations, leg swelling and PND. Musculoskeletal: Positive for joint pain. Negative for myalgias. Neurological: Negative for focal weakness. Psychiatric/Behavioral: Positive for memory loss. Physical Exam   Constitutional: She is oriented to person, place, and time. She appears well-developed and well-nourished. No distress. HENT:   Head: Normocephalic and atraumatic.    Right Ear: Tympanic membrane, external ear and ear canal normal.   Left Ear: Tympanic membrane, external ear and ear canal normal.   Eyes: Pupils are equal, round, and reactive to light. EOM are normal. Right eye exhibits no discharge. Left eye exhibits no discharge. Neck: Normal range of motion. Neck supple. Carotid bruit is not present. No thyromegaly present. Cardiovascular: Normal rate, regular rhythm, normal heart sounds and intact distal pulses. Exam reveals no gallop and no friction rub. No murmur heard. Pulmonary/Chest: Effort normal and breath sounds normal. No respiratory distress. She has no wheezes. She has no rales. Abdominal: Soft. Bowel sounds are normal. She exhibits no distension and no mass. There is no tenderness. There is no rebound and no guarding. Musculoskeletal: Normal range of motion. She exhibits no edema or tenderness. Lymphadenopathy:     She has no cervical adenopathy. Neurological: She is alert and oriented to person, place, and time. She has normal reflexes. Skin: Skin is warm and dry. No rash noted. Psychiatric: She has a normal mood and affect. Her behavior is normal.   Nursing note and vitals reviewed. ASSESSMENT and PLAN  Diagnoses and all orders for this visit:    1. Medicare annual wellness visit, subsequent    2. Mixed hyperlipidemia  -     HEPATIC FUNCTION PANEL  -     LIPID PANEL    3. Acute idiopathic gout of right foot  -     URIC ACID    4. Essential hypertension  -     metoprolol tartrate (LOPRESSOR) 100 mg IR tablet; Take 0.5 Tabs by mouth two (2) times a day. Indications: high blood pressure. Continue other medications in addition to current changes     5. Memory loss- Continue current regimen of prescription and / or OTC medications , See neurologist as directed     6. IFG (impaired fasting glucose)  -     HEMOGLOBIN A1C WITH EAG    7. Encounter for immunization    8. Arthritis- Continue current regimen of prescription and / or OTC medications     9.  Screening for depression  -     Anamaria Amanda

## 2019-08-20 NOTE — PROGRESS NOTES
Bogdan Yang is a 77 y.o. female    Chief Complaint   Patient presents with   [facility] Annual Wellness Visit       1. Have you been to the ER, urgent care clinic since your last visit? Hospitalized since your last visit? No    2. Have you seen or consulted any other health care providers outside of the [address] [place] [address] since your last visit? Include any pap smears or colon screening.  No

## 2019-08-20 NOTE — PATIENT INSTRUCTIONS
Medicare Wellness Visit, Female     The best way to live healthy is to have a lifestyle where you eat a well-balanced diet, exercise regularly, limit alcohol use, and quit all forms of tobacco/nicotine, if applicable. Regular preventive services are another way to keep healthy. Preventive services (vaccines, screening tests, monitoring & exams) can help personalize your care plan, which helps you manage your own care. Screening tests can find health problems at the earliest stages, when they are easiest to treat. [facility] follows the current, evidence-based guidelines published by the [facility]a ([facility]) when recommending preventive services for our patients. Because we follow these guidelines, sometimes recommendations change over time as research supports it. (For example, mammograms used to be recommended annually. Even though Medicare will still pay for an annual mammogram, the newer guidelines recommend a mammogram every two years for women of average risk.)  Of course, you and your doctor may decide to screen more often for some diseases, based on your risk and your health status. Preventive services for you include:  - Medicare offers their members a free annual wellness visit, which is time for you and your primary care provider to discuss and plan for your preventive service needs. Take advantage of this benefit every year!  -All adults over the age of 72 should receive the recommended pneumonia vaccines. Current USPSTF guidelines recommend a series of two vaccines for the best pneumonia protection.   -All adults should have a flu vaccine yearly and a tetanus vaccine every 10 years. All adults age 61 and older should receive a shingles vaccine once in their lifetime.    -A bone mass density test is recommended when a woman turns 65 to screen for osteoporosis. This test is only recommended one time, as a screening.  Some providers will use this same test as a disease monitoring tool if you already have osteoporosis. -All adults age 38-68 who are overweight should have a diabetes screening test once every three years.   -Other screening tests and preventive services for persons with diabetes include: an eye exam to screen for diabetic retinopathy, a kidney function test, a foot exam, and stricter control over your cholesterol.   -Cardiovascular screening for adults with routine risk involves an electrocardiogram (ECG) at intervals determined by your doctor.   -Colorectal cancer screenings should be done for adults age 54-65 with no increased risk factors for colorectal cancer. There are a number of acceptable methods of screening for this type of cancer. Each test has its own benefits and drawbacks. Discuss with your doctor what is most appropriate for you during your annual wellness visit. The different tests include: colonoscopy (considered the best screening method), a fecal occult blood test, a fecal DNA test, and sigmoidoscopy. -Breast cancer screenings are recommended every other year for women of normal risk, age 54-69.  -Cervical cancer screenings for women over age 72 are only recommended with certain risk factors.   -All adults born between [facility] should be screened once for Hepatitis C.      Here is a list of your current Health Maintenance items (your personalized list of preventive services) with a due date:  Health Maintenance Due   Topic Date Due    Flu Vaccine  08/01/2019

## 2019-09-10 ENCOUNTER — HOSPITAL ENCOUNTER (OUTPATIENT)
Dept: LAB | Age: 69
Discharge: HOME | End: 2019-09-10
Payer: MEDICARE

## 2019-09-10 PROCEDURE — 83036 HEMOGLOBIN GLYCOSYLATED A1C: CPT

## 2019-09-10 PROCEDURE — 80076 HEPATIC FUNCTION PANEL: CPT

## 2019-09-10 PROCEDURE — 80061 LIPID PANEL: CPT

## 2019-09-10 PROCEDURE — 36415 COLL VENOUS BLD VENIPUNCTURE: CPT

## 2019-09-10 PROCEDURE — 84550 ASSAY OF BLOOD/URIC ACID: CPT

## 2019-09-11 ENCOUNTER — HOSPITAL ENCOUNTER (OUTPATIENT)
Dept: MAMMOGRAPHY | Age: 69
Discharge: HOME | End: 2019-09-11
Attending: INTERNAL MEDICINE
Payer: MEDICARE

## 2019-09-11 DIAGNOSIS — Z12.31 ENCOUNTER FOR MAMMOGRAM TO ESTABLISH BASELINE MAMMOGRAM: ICD-10-CM

## 2019-09-11 LAB
ALBUMIN SERPL-MCNC: 4.7 G/DL (ref 3.6–4.8)
ALP SERPL-CCNC: 76 IU/L (ref 39–117)
ALT SERPL-CCNC: 29 IU/L (ref 0–32)
AST SERPL-CCNC: 39 IU/L (ref 0–40)
BILIRUB DIRECT SERPL-MCNC: 0.15 MG/DL (ref 0–0.4)
BILIRUB SERPL-MCNC: 0.5 MG/DL (ref 0–1.2)
CHOLEST SERPL-MCNC: 178 MG/DL (ref 100–199)
EST. AVERAGE GLUCOSE BLD GHB EST-MCNC: 108 MG/DL
HBA1C MFR BLD: 5.4 % (ref 4.8–5.6)
HDLC SERPL-MCNC: 114 MG/DL
LDLC SERPL CALC-MCNC: 51 MG/DL (ref 0–99)
PROT SERPL-MCNC: 6.9 G/DL (ref 6–8.5)
TRIGL SERPL-MCNC: 63 MG/DL (ref 0–149)
URATE SERPL-MCNC: 4.9 MG/DL (ref 2.5–7.1)
VLDLC SERPL CALC-MCNC: 13 MG/DL (ref 5–40)

## 2019-09-11 PROCEDURE — 77067 SCR MAMMO BI INCL CAD: CPT

## 2019-12-12 DIAGNOSIS — M79.671 RIGHT FOOT PAIN: ICD-10-CM

## 2019-12-12 DIAGNOSIS — E78.2 MIXED HYPERLIPIDEMIA: ICD-10-CM

## 2019-12-12 DIAGNOSIS — I10 ESSENTIAL HYPERTENSION: ICD-10-CM

## 2019-12-12 DIAGNOSIS — M10.071 ACUTE IDIOPATHIC GOUT OF RIGHT FOOT: ICD-10-CM

## 2019-12-12 RX ORDER — SIMVASTATIN 20 MG/1
TABLET, FILM COATED ORAL
Qty: 90 TAB | Refills: 3 | Status: SHIPPED | OUTPATIENT
Start: 2019-12-12 | End: 2020-12-15

## 2019-12-12 RX ORDER — METOPROLOL TARTRATE 100 MG/1
50 TABLET ORAL 2 TIMES DAILY
Qty: 90 TAB | Refills: 3 | Status: SHIPPED | OUTPATIENT
Start: 2019-12-12 | End: 2020-12-15

## 2019-12-12 RX ORDER — HYDROCHLOROTHIAZIDE 25 MG/1
TABLET ORAL
Qty: 90 TAB | Refills: 3 | Status: SHIPPED | OUTPATIENT
Start: 2019-12-12 | End: 2020-12-15

## 2019-12-12 RX ORDER — POTASSIUM CHLORIDE 20 MEQ/1
TABLET, EXTENDED RELEASE ORAL
Qty: 90 TAB | Refills: 3 | Status: SHIPPED | OUTPATIENT
Start: 2019-12-12 | End: 2020-12-15

## 2019-12-12 RX ORDER — ALLOPURINOL 100 MG/1
100 TABLET ORAL DAILY
Qty: 90 TAB | Refills: 11 | Status: SHIPPED | OUTPATIENT
Start: 2019-12-12 | End: 2021-02-26

## 2019-12-12 RX ORDER — LOSARTAN POTASSIUM 50 MG/1
TABLET ORAL
Qty: 90 TAB | Refills: 3 | Status: SHIPPED | OUTPATIENT
Start: 2019-12-12 | End: 2020-12-15

## 2019-12-12 NOTE — TELEPHONE ENCOUNTER
Requested Prescriptions     Pending Prescriptions Disp Refills    metoprolol tartrate (LOPRESSOR) 100 mg IR tablet 1 Tab 0     Sig: Take 0.5 Tabs by mouth two (2) times a day. Indications: high blood pressure    hydroCHLOROthiazide (HYDRODIURIL) 25 mg tablet 90 Tab 3     Sig: TAKE 1 TABLET DAILY    losartan (COZAAR) 50 mg tablet 90 Tab 3    simvastatin (ZOCOR) 20 mg tablet 90 Tab 3    potassium chloride (K-DUR, KLOR-CON) 20 mEq tablet 90 Tab 3    allopurinol (ZYLOPRIM) 100 mg tablet 90 Tab 11     Sig: Take 1 Tab by mouth daily.        [address], [place] - [facility]  [phone]    Pt has changed to this mail order pharmacy

## 2020-02-26 ENCOUNTER — OFFICE VISIT (OUTPATIENT)
Dept: INTERNAL MEDICINE CLINIC | Age: 70
End: 2020-02-26

## 2020-02-26 VITALS
OXYGEN SATURATION: 100 % | WEIGHT: 132 LBS | HEIGHT: 62 IN | DIASTOLIC BLOOD PRESSURE: 82 MMHG | HEART RATE: 67 BPM | TEMPERATURE: 98.5 F | SYSTOLIC BLOOD PRESSURE: 122 MMHG | BODY MASS INDEX: 24.29 KG/M2 | RESPIRATION RATE: 16 BRPM

## 2020-02-26 DIAGNOSIS — I10 ESSENTIAL HYPERTENSION: Primary | ICD-10-CM

## 2020-02-26 DIAGNOSIS — M19.90 ARTHRITIS: ICD-10-CM

## 2020-02-26 DIAGNOSIS — E78.2 MIXED HYPERLIPIDEMIA: ICD-10-CM

## 2020-02-26 DIAGNOSIS — R73.01 IFG (IMPAIRED FASTING GLUCOSE): ICD-10-CM

## 2020-02-26 DIAGNOSIS — R41.3 MEMORY LOSS: ICD-10-CM

## 2020-02-26 RX ORDER — NORTRIPTYLINE HYDROCHLORIDE 10 MG/1
CAPSULE ORAL
COMMUNITY
Start: 2020-02-12 | End: 2022-10-13

## 2020-02-26 RX ORDER — SODIUM FLUORIDE 1.1 G/100G
GEL ORAL
COMMUNITY
Start: 2020-02-20 | End: 2021-03-02

## 2020-02-26 NOTE — PROGRESS NOTES
HISTORY OF PRESENT ILLNESS  Twin Cruz is a 77 y.o. female. HPI Subjective:  Gabrielle Hunt is seen today accompanied by her , Olinda Lares, who helps with history given her cognitive impairment. She is doing well by both reports. 1. Hypertension, stable on current regimen. 2. Osteoarthritis. 8/10 knee pain. She follows up with ortho and will receive injections at times. They know to avoid NSAIDs given her kidney disease. 3. CKD4, MCI. She follows up with specialists as directed. 4. Hyperlipidemia, IFG. Due for routine labs. 5. Preventive medicine. Wellness visit in six months. Current Outpatient Medications   Medication Sig    DENTAGEL 1.1 % gel     nortriptyline (PAMELOR) 10 mg capsule TAKE 1(ONE) CAPSULE BY MOUTH AT BEDTIME FOR 2(TWO) WEEKS THEN TAKE 2(TWO) CAPSULES DAILY AT BEDTIME    metoprolol tartrate (LOPRESSOR) 100 mg IR tablet Take 0.5 Tabs by mouth two (2) times a day. Indications: high blood pressure    hydroCHLOROthiazide (HYDRODIURIL) 25 mg tablet TAKE 1 TABLET DAILY    losartan (COZAAR) 50 mg tablet TAKE 1 TABLET BY MOUTH EVERY DAY (REPLACES VALSARTAN)    simvastatin (ZOCOR) 20 mg tablet TAKE 1 TABLET BY MOUTH NIGHTLY    potassium chloride (K-DUR, KLOR-CON) 20 mEq tablet TAKE 1 TABLET BY MOUTH DAILY    allopurinol (ZYLOPRIM) 100 mg tablet Take 1 Tab by mouth daily.  traMADol (ULTRAM) 50 mg tablet Take 1 Tab by mouth every six (6) hours as needed for Pain. Max Daily Amount: 200 mg.  memantine (NAMENDA) 10 mg tablet Take  by mouth two (2) times a day.  rivastigmine (EXELON) 9.5 mg/24 hr patch 1 Patch by TransDERmal route daily. Per neurology    DOCOSAHEXANOIC ACID/EPA (FISH OIL PO) Take  by mouth.  cholecalciferol (VITAMIN D3) 1,000 unit tablet Take  by mouth daily.  garlic 6,714 mg Cap Take 1 Cap by mouth daily. No current facility-administered medications for this visit. Review of Systems   Constitutional: Negative for weight loss.    Respiratory: Negative. Cardiovascular: Negative for chest pain, palpitations, leg swelling and PND. Musculoskeletal: Positive for joint pain. Negative for myalgias. Neurological: Negative for focal weakness. Psychiatric/Behavioral: Positive for memory loss. Physical Exam  Vitals signs and nursing note reviewed. Neck:      Vascular: No carotid bruit. Cardiovascular:      Rate and Rhythm: Normal rate and regular rhythm. Heart sounds: No murmur. No friction rub. No gallop. Pulmonary:      Effort: Pulmonary effort is normal. No respiratory distress. Breath sounds: Normal breath sounds. Musculoskeletal:      Left knee: She exhibits normal range of motion, no swelling and no effusion. Right lower leg: No edema. Left lower leg: No edema. ASSESSMENT and PLAN  Diagnoses and all orders for this visit:    1. Essential hypertension  -     METABOLIC PANEL, COMPREHENSIVE    2. Mixed hyperlipidemia  -     CBC WITH AUTOMATED DIFF  -     LIPID PANEL  -     TSH 3RD GENERATION    3. Memory loss- See neurologist as directed     4. IFG (impaired fasting glucose)  -     HEMOGLOBIN A1C WITH EAG    5.  Arthritis- See orthopedic surgeon as directed     Plan was reviewed with patient and family, understanding expressed

## 2020-03-06 ENCOUNTER — HOSPITAL ENCOUNTER (OUTPATIENT)
Dept: LAB | Age: 70
Discharge: HOME | End: 2020-03-06
Payer: MEDICARE

## 2020-03-06 PROCEDURE — 36415 COLL VENOUS BLD VENIPUNCTURE: CPT

## 2020-03-06 PROCEDURE — 80061 LIPID PANEL: CPT

## 2020-03-06 PROCEDURE — 84443 ASSAY THYROID STIM HORMONE: CPT

## 2020-03-06 PROCEDURE — 85025 COMPLETE CBC W/AUTO DIFF WBC: CPT

## 2020-03-06 PROCEDURE — 83036 HEMOGLOBIN GLYCOSYLATED A1C: CPT

## 2020-03-06 PROCEDURE — 80053 COMPREHEN METABOLIC PANEL: CPT

## 2020-03-07 LAB
ALBUMIN SERPL-MCNC: 4.6 G/DL (ref 3.8–4.8)
ALBUMIN/GLOB SERPL: 2.4 {RATIO} (ref 1.2–2.2)
ALP SERPL-CCNC: 84 IU/L (ref 39–117)
ALT SERPL-CCNC: 31 IU/L (ref 0–32)
AST SERPL-CCNC: 34 IU/L (ref 0–40)
BASOPHILS # BLD AUTO: 0.1 X10E3/UL (ref 0–0.2)
BASOPHILS NFR BLD AUTO: 1 %
BILIRUB SERPL-MCNC: 0.4 MG/DL (ref 0–1.2)
BUN SERPL-MCNC: 46 MG/DL (ref 8–27)
BUN/CREAT SERPL: 22 (ref 12–28)
CALCIUM SERPL-MCNC: 9.8 MG/DL (ref 8.7–10.3)
CHLORIDE SERPL-SCNC: 104 MMOL/L (ref 96–106)
CHOLEST SERPL-MCNC: 188 MG/DL (ref 100–199)
CO2 SERPL-SCNC: 23 MMOL/L (ref 20–29)
CREAT SERPL-MCNC: 2.05 MG/DL (ref 0.57–1)
EOSINOPHIL # BLD AUTO: 0.3 X10E3/UL (ref 0–0.4)
EOSINOPHIL NFR BLD AUTO: 5 %
ERYTHROCYTE [DISTWIDTH] IN BLOOD BY AUTOMATED COUNT: 12.9 % (ref 11.7–15.4)
EST. AVERAGE GLUCOSE BLD GHB EST-MCNC: 108 MG/DL
GLOBULIN SER CALC-MCNC: 1.9 G/DL (ref 1.5–4.5)
GLUCOSE SERPL-MCNC: 81 MG/DL (ref 65–99)
HBA1C MFR BLD: 5.4 % (ref 4.8–5.6)
HCT VFR BLD AUTO: 40.4 % (ref 34–46.6)
HDLC SERPL-MCNC: 115 MG/DL
HGB BLD-MCNC: 13.3 G/DL (ref 11.1–15.9)
IMM GRANULOCYTES # BLD AUTO: 0 X10E3/UL (ref 0–0.1)
IMM GRANULOCYTES NFR BLD AUTO: 0 %
LDLC SERPL CALC-MCNC: 62 MG/DL (ref 0–99)
LYMPHOCYTES # BLD AUTO: 1.7 X10E3/UL (ref 0.7–3.1)
LYMPHOCYTES NFR BLD AUTO: 26 %
MCH RBC QN AUTO: 31.9 PG (ref 26.6–33)
MCHC RBC AUTO-ENTMCNC: 32.9 G/DL (ref 31.5–35.7)
MCV RBC AUTO: 97 FL (ref 79–97)
MONOCYTES # BLD AUTO: 0.5 X10E3/UL (ref 0.1–0.9)
MONOCYTES NFR BLD AUTO: 8 %
NEUTROPHILS # BLD AUTO: 3.8 X10E3/UL (ref 1.4–7)
NEUTROPHILS NFR BLD AUTO: 60 %
PLATELET # BLD AUTO: 183 X10E3/UL (ref 150–450)
POTASSIUM SERPL-SCNC: 3.7 MMOL/L (ref 3.5–5.2)
PROT SERPL-MCNC: 6.5 G/DL (ref 6–8.5)
RBC # BLD AUTO: 4.17 X10E6/UL (ref 3.77–5.28)
SODIUM SERPL-SCNC: 142 MMOL/L (ref 134–144)
TRIGL SERPL-MCNC: 57 MG/DL (ref 0–149)
TSH SERPL DL<=0.005 MIU/L-ACNC: 0.83 UIU/ML (ref 0.45–4.5)
VLDLC SERPL CALC-MCNC: 11 MG/DL (ref 5–40)
WBC # BLD AUTO: 6.4 X10E3/UL (ref 3.4–10.8)

## 2020-08-24 ENCOUNTER — OFFICE VISIT (OUTPATIENT)
Dept: INTERNAL MEDICINE CLINIC | Age: 70
End: 2020-08-24
Payer: MEDICARE

## 2020-08-24 VITALS
HEIGHT: 62 IN | DIASTOLIC BLOOD PRESSURE: 65 MMHG | TEMPERATURE: 97 F | RESPIRATION RATE: 16 BRPM | OXYGEN SATURATION: 100 % | HEART RATE: 73 BPM | BODY MASS INDEX: 24.25 KG/M2 | SYSTOLIC BLOOD PRESSURE: 101 MMHG | WEIGHT: 131.8 LBS

## 2020-08-24 DIAGNOSIS — Z13.31 SCREENING FOR DEPRESSION: ICD-10-CM

## 2020-08-24 DIAGNOSIS — Z00.00 MEDICARE ANNUAL WELLNESS VISIT, SUBSEQUENT: Primary | ICD-10-CM

## 2020-08-24 DIAGNOSIS — R41.3 MEMORY LOSS: ICD-10-CM

## 2020-08-24 DIAGNOSIS — Z79.899 ENCOUNTER FOR LONG-TERM (CURRENT) USE OF OTHER MEDICATIONS: ICD-10-CM

## 2020-08-24 DIAGNOSIS — M10.071 ACUTE IDIOPATHIC GOUT OF RIGHT FOOT: ICD-10-CM

## 2020-08-24 DIAGNOSIS — E78.2 MIXED HYPERLIPIDEMIA: ICD-10-CM

## 2020-08-24 DIAGNOSIS — I10 ESSENTIAL HYPERTENSION: ICD-10-CM

## 2020-08-24 DIAGNOSIS — R73.01 IFG (IMPAIRED FASTING GLUCOSE): ICD-10-CM

## 2020-08-24 PROCEDURE — G8536 NO DOC ELDER MAL SCRN: HCPCS | Performed by: INTERNAL MEDICINE

## 2020-08-24 PROCEDURE — G8754 DIAS BP LESS 90: HCPCS | Performed by: INTERNAL MEDICINE

## 2020-08-24 PROCEDURE — G0439 PPPS, SUBSEQ VISIT: HCPCS | Performed by: INTERNAL MEDICINE

## 2020-08-24 PROCEDURE — G0444 DEPRESSION SCREEN ANNUAL: HCPCS | Performed by: INTERNAL MEDICINE

## 2020-08-24 PROCEDURE — G9899 SCRN MAM PERF RSLTS DOC: HCPCS | Performed by: INTERNAL MEDICINE

## 2020-08-24 PROCEDURE — 99214 OFFICE O/P EST MOD 30 MIN: CPT | Performed by: INTERNAL MEDICINE

## 2020-08-24 PROCEDURE — 3017F COLORECTAL CA SCREEN DOC REV: CPT | Performed by: INTERNAL MEDICINE

## 2020-08-24 PROCEDURE — G8399 PT W/DXA RESULTS DOCUMENT: HCPCS | Performed by: INTERNAL MEDICINE

## 2020-08-24 PROCEDURE — 1101F PT FALLS ASSESS-DOCD LE1/YR: CPT | Performed by: INTERNAL MEDICINE

## 2020-08-24 PROCEDURE — 1090F PRES/ABSN URINE INCON ASSESS: CPT | Performed by: INTERNAL MEDICINE

## 2020-08-24 PROCEDURE — G8420 CALC BMI NORM PARAMETERS: HCPCS | Performed by: INTERNAL MEDICINE

## 2020-08-24 PROCEDURE — G8432 DEP SCR NOT DOC, RNG: HCPCS | Performed by: INTERNAL MEDICINE

## 2020-08-24 PROCEDURE — G0463 HOSPITAL OUTPT CLINIC VISIT: HCPCS | Performed by: INTERNAL MEDICINE

## 2020-08-24 PROCEDURE — G8427 DOCREV CUR MEDS BY ELIG CLIN: HCPCS | Performed by: INTERNAL MEDICINE

## 2020-08-24 PROCEDURE — G8752 SYS BP LESS 140: HCPCS | Performed by: INTERNAL MEDICINE

## 2020-08-24 RX ORDER — LANOLIN ALCOHOL/MO/W.PET/CERES
1000 CREAM (GRAM) TOPICAL DAILY
COMMUNITY

## 2020-08-24 RX ORDER — COLCHICINE 0.6 MG/1
0.6 CAPSULE ORAL
Qty: 30 CAP | Refills: 1 | Status: SHIPPED | OUTPATIENT
Start: 2020-08-24

## 2020-08-24 NOTE — PROGRESS NOTES
This is the Subsequent Medicare Annual Wellness Exam, performed 12 months or more after the Initial AWV or the last Subsequent AWV    I have reviewed the patient's medical history in detail and updated the computerized patient record. History     Patient Active Problem List   Diagnosis Code    Arthritis M19.90    Hypertension I10    Herpes zoster without mention of complication T62.4    Diverticulosis of colon (without mention of hemorrhage) K57.30    Memory loss R41.3    Unspecified sleep apnea G47.30    Encounter for long-term (current) use of other medications Z79.899    Mixed hyperlipidemia E78.2    Advance directive discussed with patient Z70.80     Past Medical History:   Diagnosis Date    Arthritis     KNEE PAIN    Diverticulosis     Hypercholesteremia     Hypertension     Unspecified sleep apnea     Vitamin B 12 deficiency     Zoster       Past Surgical History:   Procedure Laterality Date    ENDOSCOPY, COLON, DIAGNOSTIC      16, due 32    HX HYSTERECTOMY      HX ORTHOPAEDIC  '07    RT. SHOULDER    HX ORTHOPAEDIC  6/08    RT. TKR     Current Outpatient Medications   Medication Sig Dispense Refill    cyanocobalamin (Vitamin B-12) 1,000 mcg tablet Take 1,000 mcg by mouth daily.  DENTAGEL 1.1 % gel       nortriptyline (PAMELOR) 10 mg capsule TAKE 1(ONE) CAPSULE BY MOUTH AT BEDTIME FOR 2(TWO) WEEKS THEN TAKE 2(TWO) CAPSULES DAILY AT BEDTIME      metoprolol tartrate (LOPRESSOR) 100 mg IR tablet Take 0.5 Tabs by mouth two (2) times a day.  Indications: high blood pressure 90 Tab 3    hydroCHLOROthiazide (HYDRODIURIL) 25 mg tablet TAKE 1 TABLET DAILY 90 Tab 3    losartan (COZAAR) 50 mg tablet TAKE 1 TABLET BY MOUTH EVERY DAY (REPLACES VALSARTAN) 90 Tab 3    simvastatin (ZOCOR) 20 mg tablet TAKE 1 TABLET BY MOUTH NIGHTLY 90 Tab 3    potassium chloride (K-DUR, KLOR-CON) 20 mEq tablet TAKE 1 TABLET BY MOUTH DAILY 90 Tab 3    allopurinol (ZYLOPRIM) 100 mg tablet Take 1 Tab by mouth daily. 90 Tab 11    memantine (NAMENDA) 10 mg tablet Take  by mouth two (2) times a day.  rivastigmine (EXELON) 9.5 mg/24 hr patch 1 Patch by TransDERmal route daily. Per neurology 1 Patch 0    DOCOSAHEXANOIC ACID/EPA (FISH OIL PO) Take  by mouth.  cholecalciferol (VITAMIN D3) 1,000 unit tablet Take  by mouth daily.  garlic 6,362 mg Cap Take 1 Cap by mouth daily. Allergies   Allergen Reactions    Latex, Natural Rubber Rash    Adhesive Tape Rash    Motrin [Ibuprofen] Other (comments)     Joint stiffness    Percocet [Oxycodone-Acetaminophen] Hives       Family History   Problem Relation Age of Onset    Hypertension Mother     Stroke Mother     Diabetes Father     Hypertension Father     Alcohol abuse Neg Hx     Arthritis-osteo Neg Hx     Asthma Neg Hx     Bleeding Prob Neg Hx     Cancer Neg Hx     Elevated Lipids Neg Hx     Headache Neg Hx     Heart Disease Neg Hx     Lung Disease Neg Hx     Migraines Neg Hx     Psychiatric Disorder Neg Hx     Mental Retardation Neg Hx      Social History     Tobacco Use    Smoking status: Never Smoker    Smokeless tobacco: Never Used   Substance Use Topics    Alcohol use: Yes     Alcohol/week: 0.0 standard drinks     Comment: 1 PER DAY rare       Depression Risk Factor Screening:     3 most recent PHQ Screens 2/26/2020   Little interest or pleasure in doing things Not at all   Feeling down, depressed, irritable, or hopeless Not at all   Total Score PHQ 2 0       Alcohol Risk Factor Screening:   Do you average 1 drink per night or more than 7 drinks a week:  No- rare    On any one occasion in the past three months have you have had more than 3 drinks containing alcohol:  No      Functional Ability and Level of Safety:   Hearing: Hearing is good. Activities of Daily Living: The home contains: no safety equipment.   Patient needs help with:  transportation, managing medications and managing money     Ambulation: with mild difficulty Fall Risk:  Fall Risk Assessment, last 12 mths 8/24/2020   Able to walk? Yes   Fall in past 12 months? No     Abuse Screen:  Patient is not abused       Cognitive Screening   Has your family/caregiver stated any concerns about your memory: yes - - has know dementia     Cognitive Screening: Abnormal - previously documented    Patient Care Team   Patient Care Team:  Danna Aguilar MD as PCP - General  Danna Aguilar MD as PCP - [facility] Empaneled Provider    Assessment/Plan   Education and counseling provided:  Are appropriate based on today's review and evaluation    Diagnoses and all orders for this visit:    1. Medicare annual wellness visit, subsequent    2. Essential hypertension  -     METABOLIC PANEL, BASIC  -     URINALYSIS W/ RFLX MICROSCOPIC    3. Encounter for long-term (current) use of other medications  -     CBC WITH AUTOMATED DIFF    4. Mixed hyperlipidemia  -     LIPID PANEL  -     HEPATIC FUNCTION PANEL  -     TSH 3RD GENERATION    5. Memory loss    6. IFG (impaired fasting glucose)    7. Acute idiopathic gout of right foot    8.  Screening for depression  -     [place] Maintenance Due   Topic Date Due    GLAUCOMA SCREENING Q2Y  02/14/2020    Medicare Yearly Exam  08/20/2020

## 2020-08-24 NOTE — PATIENT INSTRUCTIONS
Medicare Wellness Visit, Female The best way to live healthy is to have a lifestyle where you eat a well-balanced diet, exercise regularly, limit alcohol use, and quit all forms of tobacco/nicotine, if applicable. Regular preventive services are another way to keep healthy. Preventive services (vaccines, screening tests, monitoring & exams) can help personalize your care plan, which helps you manage your own care. Screening tests can find health problems at the earliest stages, when they are easiest to treat. [facility] follows the current, evidence-based guidelines published by the [facility] Doty ([facility]) when recommending preventive services for our patients. Because we follow these guidelines, sometimes recommendations change over time as research supports it. (For example, mammograms used to be recommended annually. Even though Medicare will still pay for an annual mammogram, the newer guidelines recommend a mammogram every two years for women of average risk). Of course, you and your doctor may decide to screen more often for some diseases, based on your risk and your co-morbidities (chronic disease you are already diagnosed with). Preventive services for you include: - Medicare offers their members a free annual wellness visit, which is time for you and your primary care provider to discuss and plan for your preventive service needs. Take advantage of this benefit every year! 
-All adults over the age of 72 should receive the recommended pneumonia vaccines. Current USPSTF guidelines recommend a series of two vaccines for the best pneumonia protection.  
-All adults should have a flu vaccine yearly and a tetanus vaccine every 10 years.  
-All adults age 48 and older should receive the shingles vaccines (series of two vaccines). -All adults age 38-68 who are overweight should have a diabetes screening test once every three years. -All adults born between [birth year] and 1964 should be screened once for Hepatitis C. 
-Other screening tests and preventive services for persons with diabetes include: an eye exam to screen for diabetic retinopathy, a kidney function test, a foot exam, and stricter control over your cholesterol.  
-Cardiovascular screening for adults with routine risk involves an electrocardiogram (ECG) at intervals determined by your doctor.  
-Colorectal cancer screenings should be done for adults age 54-65 with no increased risk factors for colorectal cancer. There are a number of acceptable methods of screening for this type of cancer. Each test has its own benefits and drawbacks. Discuss with your doctor what is most appropriate for you during your annual wellness visit. The different tests include: colonoscopy (considered the best screening method), a fecal occult blood test, a fecal DNA test, and sigmoidoscopy. 
 
-A bone mass density test is recommended when a woman turns 65 to screen for osteoporosis. This test is only recommended one time, as a screening. Some providers will use this same test as a disease monitoring tool if you already have osteoporosis. -Breast cancer screenings are recommended every other year for women of normal risk, age 54-69. 
-Cervical cancer screenings for women over age 72 are only recommended with certain risk factors. Here is a list of your current Health Maintenance items (your personalized list of preventive services) with a due date: 
Health Maintenance Due Topic Date Due  Glaucoma Screening   02/14/2020 [address] [place] [address] Annual Well Visit  08/20/2020

## 2020-08-24 NOTE — PROGRESS NOTES
HISTORY OF PRESENT ILLNESS  Caesar Mohs is a 72 y.o. female. HPI Subjective:  Ailin Faith is seen today for a Medicare wellness visit and follow up of chronic problems. 1. Medicare wellness visit. See attached note. She is due for labs and bone density study. She is up to date with eye exam, colonoscopy, mammogram and vaccinations. 2. Chronic problems are reviewed. a. Hypertension, CKD. Blood pressure is excellent. Her kidney specialist, Dr. Kanika Syed, took her off HCTZ, but she had some increased leg swelling, so she was put back on it. I think we can follow for now. She is establishing with a new nephrologist given the intermediate of Dr. Kanika Syed. b. Gout. She has been off Colchicine. She has had no flare-ups. This was prescribed by a podiatrist.  We will keep this for prn use only. c. Hyperlipidemia. Due for routine lab check. No cardiac symptoms noted. d. Dementia. Follows up with neurology tomorrow, otherwise has been stable. Current Outpatient Medications   Medication Sig    cyanocobalamin (Vitamin B-12) 1,000 mcg tablet Take 1,000 mcg by mouth daily.  colchicine (MITIGARE) 0.6 mg capsule Take 1 Cap by mouth daily as needed (gout pain).  DENTAGEL 1.1 % gel     nortriptyline (PAMELOR) 10 mg capsule TAKE 1(ONE) CAPSULE BY MOUTH AT BEDTIME FOR 2(TWO) WEEKS THEN TAKE 2(TWO) CAPSULES DAILY AT BEDTIME    metoprolol tartrate (LOPRESSOR) 100 mg IR tablet Take 0.5 Tabs by mouth two (2) times a day. Indications: high blood pressure    hydroCHLOROthiazide (HYDRODIURIL) 25 mg tablet TAKE 1 TABLET DAILY    losartan (COZAAR) 50 mg tablet TAKE 1 TABLET BY MOUTH EVERY DAY (REPLACES VALSARTAN)    simvastatin (ZOCOR) 20 mg tablet TAKE 1 TABLET BY MOUTH NIGHTLY    potassium chloride (K-DUR, KLOR-CON) 20 mEq tablet TAKE 1 TABLET BY MOUTH DAILY    allopurinol (ZYLOPRIM) 100 mg tablet Take 1 Tab by mouth daily.  memantine (NAMENDA) 10 mg tablet Take  by mouth two (2) times a day.     rivastigmine (EXELON) 9.5 mg/24 hr patch 1 Patch by TransDERmal route daily. Per neurology    DOCOSAHEXANOIC ACID/EPA (FISH OIL PO) Take  by mouth.  cholecalciferol (VITAMIN D3) 1,000 unit tablet Take  by mouth daily.  garlic 8,602 mg Cap Take 1 Cap by mouth daily. No current facility-administered medications for this visit. Review of Systems   Constitutional: Negative for weight loss. HENT: Negative for hearing loss and sore throat. Respiratory: Negative. Cardiovascular: Negative for chest pain, palpitations, leg swelling and PND. Gastrointestinal: Negative. Genitourinary: Negative. Musculoskeletal: Positive for joint pain. Negative for myalgias. Neurological: Negative for focal weakness. Psychiatric/Behavioral: Positive for memory loss. Physical Exam  Vitals signs and nursing note reviewed. Constitutional:       General: She is not in acute distress. Appearance: She is well-developed. HENT:      Head: Normocephalic and atraumatic. Right Ear: Tympanic membrane, ear canal and external ear normal.      Left Ear: Tympanic membrane, ear canal and external ear normal.   Eyes:      General:         Right eye: No discharge. Left eye: No discharge. Pupils: Pupils are equal, round, and reactive to light. Neck:      Musculoskeletal: Normal range of motion and neck supple. Thyroid: No thyromegaly. Vascular: No carotid bruit. Cardiovascular:      Rate and Rhythm: Normal rate and regular rhythm. Pulses:           Dorsalis pedis pulses are 1+ on the right side and 1+ on the left side. Heart sounds: Normal heart sounds. No murmur. No friction rub. No gallop. Pulmonary:      Effort: Pulmonary effort is normal. No respiratory distress. Breath sounds: Normal breath sounds. No wheezing or rales. Abdominal:      General: Bowel sounds are normal. There is no distension. Palpations: Abdomen is soft. There is no mass. Tenderness: There is no abdominal tenderness. There is no guarding or rebound. Musculoskeletal: Normal range of motion. General: No tenderness. Lymphadenopathy:      Cervical: No cervical adenopathy. Skin:     General: Skin is warm and dry. Findings: No rash. Neurological:      Mental Status: She is alert and oriented to person, place, and time. Deep Tendon Reflexes: Reflexes are normal and symmetric. Psychiatric:         Behavior: Behavior normal.         ASSESSMENT and PLAN  Diagnoses and all orders for this visit:    1. Medicare annual wellness visit, subsequent    2. Essential hypertension  -     METABOLIC PANEL, BASIC  -     URINALYSIS W/ RFLX MICROSCOPIC    3. Encounter for long-term (current) use of other medications  -     CBC WITH AUTOMATED DIFF    4. Mixed hyperlipidemia  -     LIPID PANEL  -     HEPATIC FUNCTION PANEL  -     TSH 3RD GENERATION    5. Memory loss- See neurologist as directed     6. IFG (impaired fasting glucose)- Diet and exercise     7. Acute idiopathic gout of right foot  -     colchicine (MITIGARE) 0.6 mg capsule; Take 1 Cap by mouth daily as needed (gout pain).     8. Screening for depression  -     [address] [facility] was reviewed with patient and family, understanding expressed

## 2020-09-14 ENCOUNTER — HOSPITAL ENCOUNTER (OUTPATIENT)
Dept: LAB | Age: 70
Discharge: HOME | End: 2020-09-14
Payer: MEDICARE

## 2020-09-14 PROCEDURE — 84443 ASSAY THYROID STIM HORMONE: CPT

## 2020-09-14 PROCEDURE — 85025 COMPLETE CBC W/AUTO DIFF WBC: CPT

## 2020-09-14 PROCEDURE — 80048 BASIC METABOLIC PNL TOTAL CA: CPT

## 2020-09-14 PROCEDURE — 81001 URINALYSIS AUTO W/SCOPE: CPT

## 2020-09-14 PROCEDURE — 80076 HEPATIC FUNCTION PANEL: CPT

## 2020-09-14 PROCEDURE — 36415 COLL VENOUS BLD VENIPUNCTURE: CPT

## 2020-09-14 PROCEDURE — 80061 LIPID PANEL: CPT

## 2020-09-15 LAB
ALBUMIN SERPL-MCNC: 4.6 G/DL (ref 3.8–4.8)
ALP SERPL-CCNC: 67 IU/L (ref 39–117)
ALT SERPL-CCNC: 17 IU/L (ref 0–32)
APPEARANCE UR: CLEAR
AST SERPL-CCNC: 27 IU/L (ref 0–40)
BACTERIA #/AREA URNS HPF: NORMAL /[HPF]
BASOPHILS # BLD AUTO: 0.1 X10E3/UL (ref 0–0.2)
BASOPHILS NFR BLD AUTO: 1 %
BILIRUB DIRECT SERPL-MCNC: 0.1 MG/DL (ref 0–0.4)
BILIRUB SERPL-MCNC: 0.3 MG/DL (ref 0–1.2)
BILIRUB UR QL STRIP: NEGATIVE
BUN SERPL-MCNC: 42 MG/DL (ref 8–27)
BUN/CREAT SERPL: 27 (ref 12–28)
CALCIUM SERPL-MCNC: 9.8 MG/DL (ref 8.7–10.3)
CASTS URNS QL MICRO: NORMAL /LPF
CHLORIDE SERPL-SCNC: 106 MMOL/L (ref 96–106)
CHOLEST SERPL-MCNC: 188 MG/DL (ref 100–199)
CO2 SERPL-SCNC: 23 MMOL/L (ref 20–29)
COLOR UR: YELLOW
CREAT SERPL-MCNC: 1.56 MG/DL (ref 0.57–1)
EOSINOPHIL # BLD AUTO: 0.4 X10E3/UL (ref 0–0.4)
EOSINOPHIL NFR BLD AUTO: 6 %
EPI CELLS #/AREA URNS HPF: NORMAL /HPF (ref 0–10)
ERYTHROCYTE [DISTWIDTH] IN BLOOD BY AUTOMATED COUNT: 13.5 % (ref 11.7–15.4)
GLUCOSE SERPL-MCNC: 80 MG/DL (ref 65–99)
GLUCOSE UR QL: NEGATIVE
HCT VFR BLD AUTO: 39.7 % (ref 34–46.6)
HDLC SERPL-MCNC: 105 MG/DL
HGB BLD-MCNC: 13.1 G/DL (ref 11.1–15.9)
HGB UR QL STRIP: NEGATIVE
IMM GRANULOCYTES # BLD AUTO: 0 X10E3/UL (ref 0–0.1)
IMM GRANULOCYTES NFR BLD AUTO: 0 %
KETONES UR QL STRIP: NEGATIVE
LDLC SERPL CALC-MCNC: 73 MG/DL (ref 0–99)
LEUKOCYTE ESTERASE UR QL STRIP: ABNORMAL
LYMPHOCYTES # BLD AUTO: 2.1 X10E3/UL (ref 0.7–3.1)
LYMPHOCYTES NFR BLD AUTO: 29 %
MCH RBC QN AUTO: 31.6 PG (ref 26.6–33)
MCHC RBC AUTO-ENTMCNC: 33 G/DL (ref 31.5–35.7)
MCV RBC AUTO: 96 FL (ref 79–97)
MICRO URNS: ABNORMAL
MONOCYTES # BLD AUTO: 0.7 X10E3/UL (ref 0.1–0.9)
MONOCYTES NFR BLD AUTO: 9 %
NEUTROPHILS # BLD AUTO: 4 X10E3/UL (ref 1.4–7)
NEUTROPHILS NFR BLD AUTO: 55 %
NITRITE UR QL STRIP: NEGATIVE
PH UR STRIP: 6 [PH] (ref 5–7.5)
PLATELET # BLD AUTO: 172 X10E3/UL (ref 150–450)
POTASSIUM SERPL-SCNC: 4.2 MMOL/L (ref 3.5–5.2)
PROT SERPL-MCNC: 6.7 G/DL (ref 6–8.5)
PROT UR QL STRIP: NEGATIVE
RBC # BLD AUTO: 4.15 X10E6/UL (ref 3.77–5.28)
RBC #/AREA URNS HPF: NORMAL /HPF (ref 0–2)
SODIUM SERPL-SCNC: 144 MMOL/L (ref 134–144)
SP GR UR: 1.01 (ref 1–1.03)
TRIGL SERPL-MCNC: 52 MG/DL (ref 0–149)
TSH SERPL DL<=0.005 MIU/L-ACNC: 0.89 UIU/ML (ref 0.45–4.5)
UROBILINOGEN UR STRIP-MCNC: 0.2 MG/DL (ref 0.2–1)
VLDLC SERPL CALC-MCNC: 10 MG/DL (ref 5–40)
WBC # BLD AUTO: 7.2 X10E3/UL (ref 3.4–10.8)
WBC #/AREA URNS HPF: NORMAL /HPF (ref 0–5)

## 2020-11-18 ENCOUNTER — TRANSCRIBE ORDER (OUTPATIENT)
Dept: SCHEDULING | Age: 70
End: 2020-11-18

## 2020-11-18 DIAGNOSIS — Z12.31 VISIT FOR SCREENING MAMMOGRAM: Primary | ICD-10-CM

## 2020-11-30 ENCOUNTER — HOSPITAL ENCOUNTER (OUTPATIENT)
Dept: MAMMOGRAPHY | Age: 70
Discharge: HOME | End: 2020-11-30
Attending: INTERNAL MEDICINE
Payer: MEDICARE

## 2020-11-30 DIAGNOSIS — Z12.31 VISIT FOR SCREENING MAMMOGRAM: ICD-10-CM

## 2020-11-30 PROCEDURE — 77067 SCR MAMMO BI INCL CAD: CPT

## 2020-12-15 DIAGNOSIS — I10 ESSENTIAL HYPERTENSION: ICD-10-CM

## 2020-12-15 DIAGNOSIS — E78.2 MIXED HYPERLIPIDEMIA: ICD-10-CM

## 2020-12-15 RX ORDER — LOSARTAN POTASSIUM 50 MG/1
TABLET ORAL
Qty: 90 TAB | Refills: 3 | Status: SHIPPED | OUTPATIENT
Start: 2020-12-15 | End: 2021-10-24 | Stop reason: SDUPTHER

## 2020-12-15 RX ORDER — HYDROCHLOROTHIAZIDE 25 MG/1
TABLET ORAL
Qty: 90 TAB | Refills: 3 | Status: SHIPPED | OUTPATIENT
Start: 2020-12-15

## 2020-12-15 RX ORDER — POTASSIUM CHLORIDE 20 MEQ/1
TABLET, EXTENDED RELEASE ORAL
Qty: 90 TAB | Refills: 3 | Status: SHIPPED | OUTPATIENT
Start: 2020-12-15 | End: 2021-10-17 | Stop reason: SDUPTHER

## 2020-12-15 RX ORDER — METOPROLOL TARTRATE 100 MG/1
TABLET ORAL
Qty: 90 TAB | Refills: 3 | Status: SHIPPED | OUTPATIENT
Start: 2020-12-15 | End: 2021-10-17 | Stop reason: SDUPTHER

## 2020-12-15 RX ORDER — SIMVASTATIN 20 MG/1
TABLET, FILM COATED ORAL
Qty: 90 TAB | Refills: 3 | Status: SHIPPED | OUTPATIENT
Start: 2020-12-15 | End: 2021-10-24 | Stop reason: SDUPTHER

## 2021-03-02 ENCOUNTER — OFFICE VISIT (OUTPATIENT)
Dept: INTERNAL MEDICINE CLINIC | Age: 71
End: 2021-03-02
Payer: MEDICARE

## 2021-03-02 DIAGNOSIS — Z78.0 MENOPAUSE: ICD-10-CM

## 2021-03-02 DIAGNOSIS — N18.32 STAGE 3B CHRONIC KIDNEY DISEASE (HCC): ICD-10-CM

## 2021-03-02 DIAGNOSIS — I10 ESSENTIAL HYPERTENSION: Primary | ICD-10-CM

## 2021-03-02 DIAGNOSIS — R73.01 IFG (IMPAIRED FASTING GLUCOSE): ICD-10-CM

## 2021-03-02 DIAGNOSIS — E78.2 MIXED HYPERLIPIDEMIA: ICD-10-CM

## 2021-03-02 DIAGNOSIS — M19.90 ARTHRITIS: ICD-10-CM

## 2021-03-02 PROCEDURE — 3017F COLORECTAL CA SCREEN DOC REV: CPT | Performed by: INTERNAL MEDICINE

## 2021-03-02 PROCEDURE — G9899 SCRN MAM PERF RSLTS DOC: HCPCS | Performed by: INTERNAL MEDICINE

## 2021-03-02 PROCEDURE — G8754 DIAS BP LESS 90: HCPCS | Performed by: INTERNAL MEDICINE

## 2021-03-02 PROCEDURE — G8427 DOCREV CUR MEDS BY ELIG CLIN: HCPCS | Performed by: INTERNAL MEDICINE

## 2021-03-02 PROCEDURE — 99214 OFFICE O/P EST MOD 30 MIN: CPT | Performed by: INTERNAL MEDICINE

## 2021-03-02 PROCEDURE — G8536 NO DOC ELDER MAL SCRN: HCPCS | Performed by: INTERNAL MEDICINE

## 2021-03-02 PROCEDURE — G8753 SYS BP > OR = 140: HCPCS | Performed by: INTERNAL MEDICINE

## 2021-03-02 PROCEDURE — 1101F PT FALLS ASSESS-DOCD LE1/YR: CPT | Performed by: INTERNAL MEDICINE

## 2021-03-02 PROCEDURE — G8510 SCR DEP NEG, NO PLAN REQD: HCPCS | Performed by: INTERNAL MEDICINE

## 2021-03-02 PROCEDURE — G0463 HOSPITAL OUTPT CLINIC VISIT: HCPCS | Performed by: INTERNAL MEDICINE

## 2021-03-02 PROCEDURE — 1090F PRES/ABSN URINE INCON ASSESS: CPT | Performed by: INTERNAL MEDICINE

## 2021-03-02 PROCEDURE — G8399 PT W/DXA RESULTS DOCUMENT: HCPCS | Performed by: INTERNAL MEDICINE

## 2021-03-02 PROCEDURE — G8420 CALC BMI NORM PARAMETERS: HCPCS | Performed by: INTERNAL MEDICINE

## 2021-03-17 ENCOUNTER — HOSPITAL ENCOUNTER (OUTPATIENT)
Dept: LAB | Age: 71
Discharge: HOME | End: 2021-03-17
Payer: MEDICARE

## 2021-03-17 PROCEDURE — 80061 LIPID PANEL: CPT

## 2021-03-17 PROCEDURE — 36415 COLL VENOUS BLD VENIPUNCTURE: CPT

## 2021-03-17 PROCEDURE — 80076 HEPATIC FUNCTION PANEL: CPT

## 2021-03-18 LAB
ALBUMIN SERPL-MCNC: 4.5 G/DL (ref 3.8–4.8)
ALP SERPL-CCNC: 74 IU/L (ref 39–117)
ALT SERPL-CCNC: 18 IU/L (ref 0–32)
AST SERPL-CCNC: 26 IU/L (ref 0–40)
BILIRUB DIRECT SERPL-MCNC: 0.15 MG/DL (ref 0–0.4)
BILIRUB SERPL-MCNC: 0.4 MG/DL (ref 0–1.2)
CHOLEST SERPL-MCNC: 194 MG/DL (ref 100–199)
HDLC SERPL-MCNC: 104 MG/DL
LDLC SERPL CALC-MCNC: 78 MG/DL (ref 0–99)
PROT SERPL-MCNC: 6.9 G/DL (ref 6–8.5)
TRIGL SERPL-MCNC: 67 MG/DL (ref 0–149)
VLDLC SERPL CALC-MCNC: 12 MG/DL (ref 5–40)

## 2021-03-30 ENCOUNTER — HOSPITAL ENCOUNTER (OUTPATIENT)
Dept: BONE DENSITY | Age: 71
Discharge: HOME | End: 2021-03-30
Attending: INTERNAL MEDICINE
Payer: MEDICARE

## 2021-03-30 DIAGNOSIS — Z78.0 MENOPAUSE: ICD-10-CM

## 2021-03-30 PROCEDURE — 77080 DXA BONE DENSITY AXIAL: CPT

## 2021-04-11 VITALS
HEART RATE: 62 BPM | HEIGHT: 62 IN | DIASTOLIC BLOOD PRESSURE: 77 MMHG | BODY MASS INDEX: 24.73 KG/M2 | OXYGEN SATURATION: 100 % | SYSTOLIC BLOOD PRESSURE: 131 MMHG | RESPIRATION RATE: 20 BRPM | WEIGHT: 134.4 LBS | TEMPERATURE: 97.6 F

## 2021-08-02 DIAGNOSIS — M79.671 RIGHT FOOT PAIN: ICD-10-CM

## 2021-08-02 DIAGNOSIS — M10.071 ACUTE IDIOPATHIC GOUT OF RIGHT FOOT: ICD-10-CM

## 2021-08-03 RX ORDER — ALLOPURINOL 100 MG/1
TABLET ORAL
Qty: 90 TABLET | Refills: 3 | Status: SHIPPED | OUTPATIENT
Start: 2021-08-03 | End: 2022-05-09 | Stop reason: SDUPTHER

## 2021-10-12 ENCOUNTER — OFFICE VISIT (OUTPATIENT)
Dept: INTERNAL MEDICINE CLINIC | Age: 71
End: 2021-10-12
Payer: MEDICARE

## 2021-10-12 VITALS
TEMPERATURE: 97.1 F | DIASTOLIC BLOOD PRESSURE: 84 MMHG | HEART RATE: 59 BPM | SYSTOLIC BLOOD PRESSURE: 138 MMHG | BODY MASS INDEX: 24.48 KG/M2 | OXYGEN SATURATION: 99 % | HEIGHT: 62 IN | WEIGHT: 133 LBS | RESPIRATION RATE: 16 BRPM

## 2021-10-12 DIAGNOSIS — E78.2 MIXED HYPERLIPIDEMIA: ICD-10-CM

## 2021-10-12 DIAGNOSIS — I10 ESSENTIAL HYPERTENSION: ICD-10-CM

## 2021-10-12 DIAGNOSIS — Z13.31 SCREENING FOR DEPRESSION: ICD-10-CM

## 2021-10-12 DIAGNOSIS — M19.90 ARTHRITIS: ICD-10-CM

## 2021-10-12 DIAGNOSIS — R41.3 MEMORY LOSS: ICD-10-CM

## 2021-10-12 DIAGNOSIS — Z00.00 MEDICARE ANNUAL WELLNESS VISIT, SUBSEQUENT: Primary | ICD-10-CM

## 2021-10-12 DIAGNOSIS — R73.01 IFG (IMPAIRED FASTING GLUCOSE): ICD-10-CM

## 2021-10-12 PROCEDURE — G8399 PT W/DXA RESULTS DOCUMENT: HCPCS | Performed by: INTERNAL MEDICINE

## 2021-10-12 PROCEDURE — 1101F PT FALLS ASSESS-DOCD LE1/YR: CPT | Performed by: INTERNAL MEDICINE

## 2021-10-12 PROCEDURE — G8754 DIAS BP LESS 90: HCPCS | Performed by: INTERNAL MEDICINE

## 2021-10-12 PROCEDURE — G8420 CALC BMI NORM PARAMETERS: HCPCS | Performed by: INTERNAL MEDICINE

## 2021-10-12 PROCEDURE — G8427 DOCREV CUR MEDS BY ELIG CLIN: HCPCS | Performed by: INTERNAL MEDICINE

## 2021-10-12 PROCEDURE — G9899 SCRN MAM PERF RSLTS DOC: HCPCS | Performed by: INTERNAL MEDICINE

## 2021-10-12 PROCEDURE — 3017F COLORECTAL CA SCREEN DOC REV: CPT | Performed by: INTERNAL MEDICINE

## 2021-10-12 PROCEDURE — G8752 SYS BP LESS 140: HCPCS | Performed by: INTERNAL MEDICINE

## 2021-10-12 PROCEDURE — G0439 PPPS, SUBSEQ VISIT: HCPCS | Performed by: INTERNAL MEDICINE

## 2021-10-12 PROCEDURE — G8510 SCR DEP NEG, NO PLAN REQD: HCPCS | Performed by: INTERNAL MEDICINE

## 2021-10-12 PROCEDURE — G8536 NO DOC ELDER MAL SCRN: HCPCS | Performed by: INTERNAL MEDICINE

## 2021-10-12 NOTE — PATIENT INSTRUCTIONS

## 2021-10-12 NOTE — PROGRESS NOTES
Verified name and birth date for privacy precautions. Chart reviewed in preparation for today's visit. Chief Complaint   Patient presents with   [address] [facility] Maintenance Due   Topic    DTaP/Tdap/Td series (2 - Td or Tdap)    Medicare Yearly Exam     Flu Vaccine (1)         Wt Readings from Last 3 Encounters:   10/12/21 133 lb (60.3 kg)   03/02/21 134 lb 6.4 oz (61 kg)   08/24/20 131 lb 12.8 oz (59.8 kg)     Temp Readings from Last 3 Encounters:   10/12/21 97.1 °F (36.2 °C) (Temporal)   03/02/21 97.6 °F (36.4 °C) (Temporal)   08/24/20 97 °F (36.1 °C)     BP Readings from Last 3 Encounters:   10/12/21 138/84   03/02/21 131/77   08/24/20 101/65     Pulse Readings from Last 3 Encounters:   10/12/21 (!) 59   03/02/21 62   08/24/20 73         Learning Assessment:  :     Learning Assessment 1/24/2018 3/10/2014   PRIMARY LEARNER Patient Patient   HIGHEST LEVEL OF EDUCATION - PRIMARY LEARNER  - SOME COLLEGE   BARRIERS PRIMARY LEARNER - NONE   CO-LEARNER CAREGIVER - No   PRIMARY LANGUAGE ENGLISH ENGLISH   LEARNER PREFERENCE PRIMARY DEMONSTRATION DEMONSTRATION   ANSWERED BY patient patient   RELATIONSHIP SELF SELF       Depression Screening:  :     3 most recent PHQ Screens 10/12/2021   Little interest or pleasure in doing things Not at all   Feeling down, depressed, irritable, or hopeless Not at all   Total Score PHQ 2 0       Fall Risk Assessment:  :     Fall Risk Assessment, last 12 mths 10/12/2021   Able to walk? Yes   Fall in past 12 months? 0   Do you feel unsteady? 0   Are you worried about falling 0       Abuse Screening:  :     Abuse Screening Questionnaire 10/12/2021 4/28/2014   Do you ever feel afraid of your partner? N N   Are you in a relationship with someone who physically or mentally threatens you? N N   Is it safe for you to go home?  Umair Slider

## 2021-10-12 NOTE — PROGRESS NOTES
HISTORY OF PRESENT ILLNESS  Kayla Nicholson is a 80 y.o. female. HPI  Assessment: Travis Bradley is seen today for a Medicare Wellness Visit and follow up of chronic problems. She is accompanied by her  Genaro Moses who helps with the history given her dementia. Preventive Care. See attached Wellness Visit note. She is due for labs, COVID booster, Tdap booster and flu vaccine. She is up to date with a mammogram, colonoscopy, bone density study and other vaccinations. Chronic Problems Reviewed. Dementia is stable but is tending to be more severe. She is on a new medication from her neurologist. Genaro Moses will let me know the name. Blood pressure is fine and arthritis is stable. Review of Systems   Constitutional: Negative for weight loss. Respiratory: Negative. Cardiovascular: Negative for chest pain, palpitations, leg swelling and PND. Gastrointestinal: Negative. Genitourinary: Negative. Musculoskeletal: Positive for joint pain. Negative for myalgias. Neurological: Negative for focal weakness. Psychiatric/Behavioral: Positive for memory loss. Physical Exam  Vitals and nursing note reviewed. Constitutional:       General: She is not in acute distress. Appearance: She is well-developed. HENT:      Head: Normocephalic and atraumatic. Right Ear: Tympanic membrane, ear canal and external ear normal.      Left Ear: Tympanic membrane, ear canal and external ear normal.   Eyes:      General:         Right eye: No discharge. Left eye: No discharge. Pupils: Pupils are equal, round, and reactive to light. Neck:      Thyroid: No thyromegaly. Vascular: No carotid bruit. Cardiovascular:      Rate and Rhythm: Normal rate and regular rhythm. Heart sounds: Normal heart sounds. No murmur heard. No friction rub. No gallop. Pulmonary:      Effort: Pulmonary effort is normal. No respiratory distress. Breath sounds: Normal breath sounds.  No wheezing or rales.   Abdominal:      General: Bowel sounds are normal. There is no distension. Palpations: Abdomen is soft. There is no mass. Tenderness: There is no abdominal tenderness. There is no guarding or rebound. Musculoskeletal:         General: No tenderness. Normal range of motion. Cervical back: Normal range of motion and neck supple. Lymphadenopathy:      Cervical: No cervical adenopathy. Skin:     General: Skin is warm and dry. Findings: No rash. Neurological:      Mental Status: She is alert and oriented to person, place, and time. Mental status is at baseline. Deep Tendon Reflexes:      Reflex Scores:       Patellar reflexes are 1+ on the right side and 1+ on the left side. Psychiatric:         Behavior: Behavior normal.         ASSESSMENT and PLAN  Diagnoses and all orders for this visit:    1. Medicare annual wellness visit, subsequent    2. Essential hypertension    3. Mixed hyperlipidemia  -     METABOLIC PANEL, COMPREHENSIVE; Future  -     CBC WITH AUTOMATED DIFF; Future  -     LIPID PANEL; Future  -     TSH 3RD GENERATION; Future    4. IFG (impaired fasting glucose)  -     URINALYSIS W/ RFLX MICROSCOPIC; Future  -     HEMOGLOBIN A1C WITH EAG; Future  -     HEMOGLOBIN A1C WITH EAG; Future    5. Arthritis    6. Memory loss    7.  Screening for depression  -     Anmaaria Amanda

## 2021-10-14 ENCOUNTER — TELEPHONE (OUTPATIENT)
Dept: INTERNAL MEDICINE CLINIC | Age: 71
End: 2021-10-14

## 2021-10-14 RX ORDER — SERTRALINE HYDROCHLORIDE 50 MG/1
50 TABLET, FILM COATED ORAL DAILY
Qty: 1 TABLET | Refills: 0 | Status: SHIPPED | OUTPATIENT
Start: 2021-10-14

## 2021-10-14 NOTE — TELEPHONE ENCOUNTER
[address] spoke with Triny. Advised her MD was just adding medication to med list. Did not mean to send in with 1 tab. She has canceled order.

## 2021-10-14 NOTE — TELEPHONE ENCOUNTER
Reason for call: How many tables does the script Zoloft need to be?     Is this a new problem: yes     Date of last appointment:  10/12/2021     Can we respond via inDegreet: no    Best call back number: [facility] [phone]

## 2021-10-17 DIAGNOSIS — I10 ESSENTIAL HYPERTENSION: ICD-10-CM

## 2021-10-17 RX ORDER — SERTRALINE HYDROCHLORIDE 50 MG/1
50 TABLET, FILM COATED ORAL DAILY
Qty: 1 TABLET | Refills: 0 | Status: CANCELLED | OUTPATIENT
Start: 2021-10-17

## 2021-10-18 RX ORDER — METOPROLOL TARTRATE 100 MG/1
TABLET ORAL
Qty: 90 TABLET | Refills: 2 | Status: SHIPPED | OUTPATIENT
Start: 2021-10-18 | End: 2022-07-24 | Stop reason: SDUPTHER

## 2021-10-18 RX ORDER — POTASSIUM CHLORIDE 20 MEQ/1
TABLET, EXTENDED RELEASE ORAL
Qty: 90 TABLET | Refills: 2 | Status: SHIPPED | OUTPATIENT
Start: 2021-10-18 | End: 2022-07-11 | Stop reason: SDUPTHER

## 2021-10-24 DIAGNOSIS — E78.2 MIXED HYPERLIPIDEMIA: ICD-10-CM

## 2021-10-24 DIAGNOSIS — I10 ESSENTIAL HYPERTENSION: ICD-10-CM

## 2021-10-26 RX ORDER — SIMVASTATIN 20 MG/1
TABLET, FILM COATED ORAL
Qty: 90 TABLET | Refills: 1 | Status: SHIPPED | OUTPATIENT
Start: 2021-10-26 | End: 2022-04-13 | Stop reason: SDUPTHER

## 2021-10-26 RX ORDER — LOSARTAN POTASSIUM 50 MG/1
TABLET ORAL
Qty: 90 TABLET | Refills: 3 | Status: SHIPPED | OUTPATIENT
Start: 2021-10-26 | End: 2022-04-13 | Stop reason: SDUPTHER

## 2021-10-26 RX ORDER — LOSARTAN POTASSIUM 50 MG/1
TABLET ORAL
Qty: 90 TABLET | Refills: 1 | Status: SHIPPED | OUTPATIENT
Start: 2021-10-26 | End: 2022-04-13 | Stop reason: SDUPTHER

## 2021-10-26 RX ORDER — SIMVASTATIN 20 MG/1
TABLET, FILM COATED ORAL
Qty: 90 TABLET | Refills: 3 | Status: SHIPPED | OUTPATIENT
Start: 2021-10-26

## 2021-10-28 LAB
ALBUMIN SERPL-MCNC: 4.7 G/DL (ref 3.8–4.8)
ALBUMIN/GLOB SERPL: 2.2 {RATIO} (ref 1.2–2.2)
ALP SERPL-CCNC: 65 IU/L (ref 44–121)
ALT SERPL-CCNC: 13 IU/L (ref 0–32)
APPEARANCE UR: CLEAR
AST SERPL-CCNC: 23 IU/L (ref 0–40)
BACTERIA #/AREA URNS HPF: ABNORMAL /[HPF]
BASOPHILS # BLD AUTO: 0 X10E3/UL (ref 0–0.2)
BASOPHILS NFR BLD AUTO: 0 %
BILIRUB SERPL-MCNC: 0.4 MG/DL (ref 0–1.2)
BILIRUB UR QL STRIP: NEGATIVE
BUN SERPL-MCNC: 39 MG/DL (ref 8–27)
BUN/CREAT SERPL: 25 (ref 12–28)
CALCIUM SERPL-MCNC: 9.8 MG/DL (ref 8.7–10.3)
CASTS URNS QL MICRO: ABNORMAL /LPF
CHLORIDE SERPL-SCNC: 102 MMOL/L (ref 96–106)
CHOLEST SERPL-MCNC: 211 MG/DL (ref 100–199)
CO2 SERPL-SCNC: 27 MMOL/L (ref 20–29)
COLOR UR: YELLOW
CREAT SERPL-MCNC: 1.55 MG/DL (ref 0.57–1)
EOSINOPHIL # BLD AUTO: 0.4 X10E3/UL (ref 0–0.4)
EOSINOPHIL NFR BLD AUTO: 5 %
EPI CELLS #/AREA URNS HPF: ABNORMAL /HPF (ref 0–10)
ERYTHROCYTE [DISTWIDTH] IN BLOOD BY AUTOMATED COUNT: 13.3 % (ref 11.7–15.4)
EST. AVERAGE GLUCOSE BLD GHB EST-MCNC: 108 MG/DL
GLOBULIN SER CALC-MCNC: 2.1 G/DL (ref 1.5–4.5)
GLUCOSE SERPL-MCNC: 95 MG/DL (ref 65–99)
GLUCOSE UR QL: NEGATIVE
HBA1C MFR BLD: 5.4 % (ref 4.8–5.6)
HCT VFR BLD AUTO: 40.2 % (ref 34–46.6)
HDLC SERPL-MCNC: 97 MG/DL
HGB BLD-MCNC: 13.3 G/DL (ref 11.1–15.9)
HGB UR QL STRIP: NEGATIVE
IMM GRANULOCYTES # BLD AUTO: 0 X10E3/UL (ref 0–0.1)
IMM GRANULOCYTES NFR BLD AUTO: 0 %
KETONES UR QL STRIP: NEGATIVE
LDLC SERPL CALC-MCNC: 100 MG/DL (ref 0–99)
LEUKOCYTE ESTERASE UR QL STRIP: ABNORMAL
LYMPHOCYTES # BLD AUTO: 1.9 X10E3/UL (ref 0.7–3.1)
LYMPHOCYTES NFR BLD AUTO: 25 %
MCH RBC QN AUTO: 31.5 PG (ref 26.6–33)
MCHC RBC AUTO-ENTMCNC: 33.1 G/DL (ref 31.5–35.7)
MCV RBC AUTO: 95 FL (ref 79–97)
MICRO URNS: ABNORMAL
MONOCYTES # BLD AUTO: 0.6 X10E3/UL (ref 0.1–0.9)
MONOCYTES NFR BLD AUTO: 9 %
NEUTROPHILS # BLD AUTO: 4.4 X10E3/UL (ref 1.4–7)
NEUTROPHILS NFR BLD AUTO: 61 %
NITRITE UR QL STRIP: NEGATIVE
PH UR STRIP: 6 [PH] (ref 5–7.5)
PLATELET # BLD AUTO: 165 X10E3/UL (ref 150–450)
POTASSIUM SERPL-SCNC: 3.9 MMOL/L (ref 3.5–5.2)
PROT SERPL-MCNC: 6.8 G/DL (ref 6–8.5)
PROT UR QL STRIP: NEGATIVE
RBC # BLD AUTO: 4.22 X10E6/UL (ref 3.77–5.28)
RBC #/AREA URNS HPF: ABNORMAL /HPF (ref 0–2)
SODIUM SERPL-SCNC: 141 MMOL/L (ref 134–144)
SP GR UR: 1.01 (ref 1–1.03)
TRIGL SERPL-MCNC: 79 MG/DL (ref 0–149)
TSH SERPL DL<=0.005 MIU/L-ACNC: 0.77 UIU/ML (ref 0.45–4.5)
UROBILINOGEN UR STRIP-MCNC: 0.2 MG/DL (ref 0.2–1)
VLDLC SERPL CALC-MCNC: 14 MG/DL (ref 5–40)
WBC # BLD AUTO: 7.3 X10E3/UL (ref 3.4–10.8)
WBC #/AREA URNS HPF: ABNORMAL /HPF (ref 0–5)

## 2021-10-28 NOTE — PROGRESS NOTES
Call - There may be a uti. If symptoms are present obtain culture and start treatment. If no symptoms, obtain culture and treat only if positive.      Labs look great overall otherwise, Continue current regimen of prescription and / or OTC medications

## 2021-10-29 ENCOUNTER — TELEPHONE (OUTPATIENT)
Dept: INTERNAL MEDICINE CLINIC | Age: 71
End: 2021-10-29

## 2021-10-29 DIAGNOSIS — R82.90 ABNORMAL URINALYSIS: Primary | ICD-10-CM

## 2021-10-29 NOTE — PROGRESS NOTES
Notified patient per provider result note. Faxing lab requisition to [facility] in [place] @ [phone]. Confirmation received.

## 2021-11-03 LAB — BACTERIA UR CULT: NORMAL

## 2021-11-30 ENCOUNTER — TRANSCRIBE ORDER (OUTPATIENT)
Dept: SCHEDULING | Age: 71
End: 2021-11-30

## 2021-11-30 DIAGNOSIS — Z12.31 SCREENING MAMMOGRAM, ENCOUNTER FOR: Primary | ICD-10-CM

## 2022-02-04 ENCOUNTER — HOSPITAL ENCOUNTER (OUTPATIENT)
Dept: MAMMOGRAPHY | Age: 72
Discharge: HOME | End: 2022-02-04
Attending: INTERNAL MEDICINE
Payer: COMMERCIAL

## 2022-02-04 DIAGNOSIS — Z12.31 SCREENING MAMMOGRAM, ENCOUNTER FOR: ICD-10-CM

## 2022-02-04 PROCEDURE — 77067 SCR MAMMO BI INCL CAD: CPT

## 2022-04-13 ENCOUNTER — OFFICE VISIT (OUTPATIENT)
Dept: INTERNAL MEDICINE CLINIC | Age: 72
End: 2022-04-13
Payer: MEDICARE

## 2022-04-13 VITALS
HEART RATE: 53 BPM | DIASTOLIC BLOOD PRESSURE: 71 MMHG | OXYGEN SATURATION: 100 % | BODY MASS INDEX: 22.08 KG/M2 | RESPIRATION RATE: 16 BRPM | WEIGHT: 120 LBS | HEIGHT: 62 IN | TEMPERATURE: 97.6 F | SYSTOLIC BLOOD PRESSURE: 109 MMHG

## 2022-04-13 DIAGNOSIS — I10 ESSENTIAL HYPERTENSION: Primary | ICD-10-CM

## 2022-04-13 DIAGNOSIS — E78.2 MIXED HYPERLIPIDEMIA: ICD-10-CM

## 2022-04-13 DIAGNOSIS — F03.B18 MODERATE DEMENTIA WITH BEHAVIORAL DISTURBANCE: ICD-10-CM

## 2022-04-13 DIAGNOSIS — R73.01 IFG (IMPAIRED FASTING GLUCOSE): ICD-10-CM

## 2022-04-13 DIAGNOSIS — N18.32 STAGE 3B CHRONIC KIDNEY DISEASE (HCC): ICD-10-CM

## 2022-04-13 DIAGNOSIS — M19.90 ARTHRITIS: ICD-10-CM

## 2022-04-13 PROCEDURE — G9899 SCRN MAM PERF RSLTS DOC: HCPCS | Performed by: INTERNAL MEDICINE

## 2022-04-13 PROCEDURE — 1090F PRES/ABSN URINE INCON ASSESS: CPT | Performed by: INTERNAL MEDICINE

## 2022-04-13 PROCEDURE — 1101F PT FALLS ASSESS-DOCD LE1/YR: CPT | Performed by: INTERNAL MEDICINE

## 2022-04-13 PROCEDURE — G8420 CALC BMI NORM PARAMETERS: HCPCS | Performed by: INTERNAL MEDICINE

## 2022-04-13 PROCEDURE — G8536 NO DOC ELDER MAL SCRN: HCPCS | Performed by: INTERNAL MEDICINE

## 2022-04-13 PROCEDURE — 99214 OFFICE O/P EST MOD 30 MIN: CPT | Performed by: INTERNAL MEDICINE

## 2022-04-13 PROCEDURE — G8752 SYS BP LESS 140: HCPCS | Performed by: INTERNAL MEDICINE

## 2022-04-13 PROCEDURE — G8427 DOCREV CUR MEDS BY ELIG CLIN: HCPCS | Performed by: INTERNAL MEDICINE

## 2022-04-13 PROCEDURE — G8754 DIAS BP LESS 90: HCPCS | Performed by: INTERNAL MEDICINE

## 2022-04-13 PROCEDURE — 3017F COLORECTAL CA SCREEN DOC REV: CPT | Performed by: INTERNAL MEDICINE

## 2022-04-13 PROCEDURE — G8399 PT W/DXA RESULTS DOCUMENT: HCPCS | Performed by: INTERNAL MEDICINE

## 2022-04-13 PROCEDURE — G0463 HOSPITAL OUTPT CLINIC VISIT: HCPCS | Performed by: INTERNAL MEDICINE

## 2022-04-13 PROCEDURE — G8510 SCR DEP NEG, NO PLAN REQD: HCPCS | Performed by: INTERNAL MEDICINE

## 2022-04-13 RX ORDER — TOPIRAMATE 25 MG/1
25 TABLET ORAL 2 TIMES DAILY
COMMUNITY
End: 2022-10-13

## 2022-04-13 RX ORDER — LOSARTAN POTASSIUM 50 MG/1
TABLET ORAL
Qty: 1 TABLET | Refills: 0
Start: 2022-04-13 | End: 2022-05-24 | Stop reason: SDUPTHER

## 2022-04-13 NOTE — PROGRESS NOTES
Verified name and birth date for privacy precautions. Chart reviewed in preparation for today's visit. Chief Complaint   Patient presents with    Hypertension          Health Maintenance Due   Topic    DTaP/Tdap/Td series (2 - Td or Tdap)         Wt Readings from Last 3 Encounters:   04/13/22 120 lb (54.4 kg)   10/12/21 133 lb (60.3 kg)   03/02/21 134 lb 6.4 oz (61 kg)     Temp Readings from Last 3 Encounters:   04/13/22 97.6 °F (36.4 °C) (Temporal)   10/12/21 97.1 °F (36.2 °C) (Temporal)   03/02/21 97.6 °F (36.4 °C) (Temporal)     BP Readings from Last 3 Encounters:   04/13/22 109/71   10/12/21 138/84   03/02/21 131/77     Pulse Readings from Last 3 Encounters:   04/13/22 (!) 53   10/12/21 (!) 59   03/02/21 62         Learning Assessment:  :     Learning Assessment 1/24/2018 3/10/2014   PRIMARY LEARNER Patient Patient   HIGHEST LEVEL OF EDUCATION - PRIMARY LEARNER  - SOME COLLEGE   BARRIERS PRIMARY LEARNER - NONE   CO-LEARNER CAREGIVER - No   PRIMARY LANGUAGE ENGLISH ENGLISH   LEARNER PREFERENCE PRIMARY DEMONSTRATION DEMONSTRATION   ANSWERED BY patient patient   RELATIONSHIP SELF SELF       Depression Screening:  :     3 most recent PHQ Screens 4/13/2022   Little interest or pleasure in doing things Not at all   Feeling down, depressed, irritable, or hopeless Not at all   Total Score PHQ 2 0       Fall Risk Assessment:  :     Fall Risk Assessment, last 12 mths 4/13/2022   Able to walk? Yes   Fall in past 12 months? 0   Do you feel unsteady? 0   Are you worried about falling 0       Abuse Screening:  :     Abuse Screening Questionnaire 4/13/2022 10/12/2021 4/28/2014   Do you ever feel afraid of your partner? N N N   Are you in a relationship with someone who physically or mentally threatens you? N N N   Is it safe for you to go home?  Tree García

## 2022-04-13 NOTE — PROGRESS NOTES
HISTORY OF PRESENT ILLNESS  Haroon Beck is a 80 y.o. female. HPI  Assessment:  Azeem Jay is seen today accompanied by her , Leatha Solis, who helps with the history due to her dementia. She is here for follow up of dementia and other problems. 1. CKD3. Due for routine labs. 2. Hypertension. Lower readings noted today, see below. 3. Hyperlipidemia. Due for routine labs to assess for prescription medication management. 4. Dementia, having some progression. She follows up with a neurologist.  She has had some hallucination type behaviors. In addition, she has had some headache and dizziness. I think the dizziness may be contributed to by the lower blood pressure. She does follow up closely with her neurologist and we will decrease Losartan by one half and I will ask them to monitor blood pressure at home. Review of Systems   Constitutional: Negative for weight loss. Respiratory: Negative. Cardiovascular: Negative for chest pain, palpitations, leg swelling and PND. Musculoskeletal: Positive for joint pain. Negative for myalgias. Neurological: Positive for dizziness and headaches. Negative for focal weakness. See neurologist as directed    Psychiatric/Behavioral: Positive for hallucinations and memory loss. Physical Exam  Vitals and nursing note reviewed. Neck:      Vascular: No carotid bruit. Cardiovascular:      Rate and Rhythm: Normal rate and regular rhythm. Heart sounds: No murmur heard. No friction rub. No gallop. Pulmonary:      Effort: Pulmonary effort is normal. No respiratory distress. Breath sounds: Normal breath sounds. Musculoskeletal:      Right lower leg: No edema. Left lower leg: No edema. Neurological:      Comments: Minimally verbal         ASSESSMENT and PLAN  Diagnoses and all orders for this visit:    1. Essential hypertension  -     losartan (COZAAR) 50 mg tablet; TAKE 1/2  TABLET DAILY - new directions    2.  Stage 3b chronic kidney disease ([facility]    3. Mixed hyperlipidemia  -     METABOLIC PANEL, COMPREHENSIVE; Future  -     CBC WITH AUTOMATED DIFF; Future  -     LIPID PANEL; Future    4. IFG (impaired fasting glucose)    5. Arthritis    6.  Moderate dementia with behavioral disturbance ([facility]

## 2022-04-20 LAB
ALBUMIN SERPL-MCNC: 4.9 G/DL (ref 3.8–4.8)
ALBUMIN/GLOB SERPL: 2.6 {RATIO} (ref 1.2–2.2)
ALP SERPL-CCNC: 50 IU/L (ref 44–121)
ALT SERPL-CCNC: 7 IU/L (ref 0–32)
AST SERPL-CCNC: 15 IU/L (ref 0–40)
BASOPHILS # BLD AUTO: 0.1 X10E3/UL (ref 0–0.2)
BASOPHILS NFR BLD AUTO: 1 %
BILIRUB SERPL-MCNC: 0.2 MG/DL (ref 0–1.2)
BUN SERPL-MCNC: 43 MG/DL (ref 8–27)
BUN/CREAT SERPL: 24 (ref 12–28)
CALCIUM SERPL-MCNC: 10.3 MG/DL (ref 8.7–10.3)
CHLORIDE SERPL-SCNC: 106 MMOL/L (ref 96–106)
CHOLEST SERPL-MCNC: 217 MG/DL (ref 100–199)
CO2 SERPL-SCNC: 23 MMOL/L (ref 20–29)
CREAT SERPL-MCNC: 1.81 MG/DL (ref 0.57–1)
EGFR: 30 ML/MIN/1.73
EOSINOPHIL # BLD AUTO: 0.3 X10E3/UL (ref 0–0.4)
EOSINOPHIL NFR BLD AUTO: 3 %
ERYTHROCYTE [DISTWIDTH] IN BLOOD BY AUTOMATED COUNT: 13.1 % (ref 11.7–15.4)
GLOBULIN SER CALC-MCNC: 1.9 G/DL (ref 1.5–4.5)
GLUCOSE SERPL-MCNC: 104 MG/DL (ref 65–99)
HCT VFR BLD AUTO: 43.3 % (ref 34–46.6)
HDLC SERPL-MCNC: 79 MG/DL
HGB BLD-MCNC: 13.9 G/DL (ref 11.1–15.9)
IMM GRANULOCYTES # BLD AUTO: 0 X10E3/UL (ref 0–0.1)
IMM GRANULOCYTES NFR BLD AUTO: 0 %
LDLC SERPL CALC-MCNC: 123 MG/DL (ref 0–99)
LYMPHOCYTES # BLD AUTO: 1.4 X10E3/UL (ref 0.7–3.1)
LYMPHOCYTES NFR BLD AUTO: 19 %
MCH RBC QN AUTO: 30.2 PG (ref 26.6–33)
MCHC RBC AUTO-ENTMCNC: 32.1 G/DL (ref 31.5–35.7)
MCV RBC AUTO: 94 FL (ref 79–97)
MONOCYTES # BLD AUTO: 0.6 X10E3/UL (ref 0.1–0.9)
MONOCYTES NFR BLD AUTO: 7 %
NEUTROPHILS # BLD AUTO: 5.3 X10E3/UL (ref 1.4–7)
NEUTROPHILS NFR BLD AUTO: 70 %
PLATELET # BLD AUTO: 162 X10E3/UL (ref 150–450)
POTASSIUM SERPL-SCNC: 3.5 MMOL/L (ref 3.5–5.2)
PROT SERPL-MCNC: 6.8 G/DL (ref 6–8.5)
RBC # BLD AUTO: 4.6 X10E6/UL (ref 3.77–5.28)
SODIUM SERPL-SCNC: 146 MMOL/L (ref 134–144)
TRIGL SERPL-MCNC: 88 MG/DL (ref 0–149)
VLDLC SERPL CALC-MCNC: 15 MG/DL (ref 5–40)
WBC # BLD AUTO: 7.6 X10E3/UL (ref 3.4–10.8)

## 2022-04-25 ENCOUNTER — TELEPHONE (OUTPATIENT)
Dept: INTERNAL MEDICINE CLINIC | Age: 72
End: 2022-04-25

## 2022-04-25 DIAGNOSIS — N18.32 STAGE 3B CHRONIC KIDNEY DISEASE (HCC): Primary | ICD-10-CM

## 2022-04-25 NOTE — LETTER
4/25/2022 5:57 PM    Ms. [address] [place] [id number]    TO WHOM IT MAY CONCERN-    Due to cognitive impairment from progressive dementia Ms Jeremias Diehl is unable to make financial, or other significant  decisions for herself.            Sincerely,      Vidal Eagle MD

## 2022-04-25 NOTE — TELEPHONE ENCOUNTER
Reviewed lab -   1. Repeat BMP in one month, non fasting  2. Elevated cholesterol- will follow on current regimen given other problems  3. Will mail letter re jennifer. Jaun Fontana,             April 14, 2022   I need to be listed or qualified as power of  for [place]. I told by a paraprofessional to ask my physician for a letter stating the need for such. If this is true, will  you help me? [place] is not capable at this time to make any financial or other decisions at this time. Thanks for any help or information you can give me.   Thanks, Bev Gilbert

## 2022-05-02 ENCOUNTER — TELEPHONE (OUTPATIENT)
Dept: INTERNAL MEDICINE CLINIC | Age: 72
End: 2022-05-02

## 2022-05-02 NOTE — TELEPHONE ENCOUNTER
Results of Emily's Blood Pressure. Checks were randomly performed from 4-14-22 through 4-25-22. Average results:  Left arm (9) test: 127/73. Right arm (7) test: 126/71. High # Left: 145/90; Right:  143/89 different days.    Talk later,  Zhang January

## 2022-05-09 DIAGNOSIS — M79.671 RIGHT FOOT PAIN: ICD-10-CM

## 2022-05-09 DIAGNOSIS — M10.071 ACUTE IDIOPATHIC GOUT OF RIGHT FOOT: ICD-10-CM

## 2022-05-09 RX ORDER — ALLOPURINOL 100 MG/1
TABLET ORAL
Qty: 90 TABLET | Refills: 3 | Status: SHIPPED | OUTPATIENT
Start: 2022-05-09

## 2022-05-24 DIAGNOSIS — I10 ESSENTIAL HYPERTENSION: ICD-10-CM

## 2022-05-24 RX ORDER — LOSARTAN POTASSIUM 50 MG/1
TABLET ORAL
Qty: 90 TABLET | Refills: 1 | Status: SHIPPED | OUTPATIENT
Start: 2022-05-24

## 2022-05-24 NOTE — TELEPHONE ENCOUNTER
Requested Prescriptions     Pending Prescriptions Disp Refills    losartan (COZAAR) 50 mg tablet 1 Tablet 0     Sig: TAKE 1/2  TABLET DAILY - new directions     [address] [place], [place] - [address] [employer]  [phone]

## 2022-05-25 LAB
BUN SERPL-MCNC: 38 MG/DL (ref 8–27)
BUN/CREAT SERPL: 22 (ref 12–28)
CALCIUM SERPL-MCNC: 9.9 MG/DL (ref 8.7–10.3)
CHLORIDE SERPL-SCNC: 106 MMOL/L (ref 96–106)
CO2 SERPL-SCNC: 24 MMOL/L (ref 20–29)
CREAT SERPL-MCNC: 1.74 MG/DL (ref 0.57–1)
EGFR: 31 ML/MIN/1.73
GLUCOSE SERPL-MCNC: 77 MG/DL (ref 65–99)
POTASSIUM SERPL-SCNC: 3.7 MMOL/L (ref 3.5–5.2)
SODIUM SERPL-SCNC: 145 MMOL/L (ref 134–144)

## 2022-07-11 DIAGNOSIS — I10 ESSENTIAL HYPERTENSION: ICD-10-CM

## 2022-07-11 RX ORDER — POTASSIUM CHLORIDE 20 MEQ/1
TABLET, EXTENDED RELEASE ORAL
Qty: 90 TABLET | Refills: 0 | Status: SHIPPED | OUTPATIENT
Start: 2022-07-11 | End: 2022-10-10 | Stop reason: SDUPTHER

## 2022-07-24 DIAGNOSIS — I10 ESSENTIAL HYPERTENSION: ICD-10-CM

## 2022-07-25 RX ORDER — METOPROLOL TARTRATE 100 MG/1
TABLET ORAL
Qty: 90 TABLET | Refills: 0 | Status: SHIPPED | OUTPATIENT
Start: 2022-07-25 | End: 2022-10-24 | Stop reason: SDUPTHER

## 2022-10-13 ENCOUNTER — OFFICE VISIT (OUTPATIENT)
Dept: INTERNAL MEDICINE CLINIC | Age: 72
End: 2022-10-13
Payer: MEDICARE

## 2022-10-13 ENCOUNTER — PATIENT OUTREACH (OUTPATIENT)
Dept: CASE MANAGEMENT | Age: 72
End: 2022-10-13

## 2022-10-13 VITALS
WEIGHT: 113.8 LBS | HEART RATE: 56 BPM | HEIGHT: 62 IN | BODY MASS INDEX: 20.94 KG/M2 | OXYGEN SATURATION: 100 % | SYSTOLIC BLOOD PRESSURE: 105 MMHG | RESPIRATION RATE: 16 BRPM | DIASTOLIC BLOOD PRESSURE: 67 MMHG | TEMPERATURE: 97.1 F

## 2022-10-13 DIAGNOSIS — Z23 ENCOUNTER FOR IMMUNIZATION: ICD-10-CM

## 2022-10-13 DIAGNOSIS — F03.B18 MODERATE DEMENTIA WITH BEHAVIORAL DISTURBANCE: ICD-10-CM

## 2022-10-13 DIAGNOSIS — N18.31 STAGE 3A CHRONIC KIDNEY DISEASE (HCC): ICD-10-CM

## 2022-10-13 DIAGNOSIS — M25.562 CHRONIC PAIN OF LEFT KNEE: ICD-10-CM

## 2022-10-13 DIAGNOSIS — Z00.00 ENCOUNTER FOR SUBSEQUENT ANNUAL WELLNESS VISIT (AWV) IN MEDICARE PATIENT: Primary | ICD-10-CM

## 2022-10-13 DIAGNOSIS — I10 PRIMARY HYPERTENSION: ICD-10-CM

## 2022-10-13 DIAGNOSIS — G89.29 CHRONIC PAIN OF LEFT KNEE: ICD-10-CM

## 2022-10-13 PROCEDURE — 3017F COLORECTAL CA SCREEN DOC REV: CPT | Performed by: INTERNAL MEDICINE

## 2022-10-13 PROCEDURE — G9899 SCRN MAM PERF RSLTS DOC: HCPCS | Performed by: INTERNAL MEDICINE

## 2022-10-13 PROCEDURE — G8432 DEP SCR NOT DOC, RNG: HCPCS | Performed by: INTERNAL MEDICINE

## 2022-10-13 PROCEDURE — G8536 NO DOC ELDER MAL SCRN: HCPCS | Performed by: INTERNAL MEDICINE

## 2022-10-13 PROCEDURE — G8752 SYS BP LESS 140: HCPCS | Performed by: INTERNAL MEDICINE

## 2022-10-13 PROCEDURE — G0008 ADMIN INFLUENZA VIRUS VAC: HCPCS | Performed by: INTERNAL MEDICINE

## 2022-10-13 PROCEDURE — 90694 VACC AIIV4 NO PRSRV 0.5ML IM: CPT | Performed by: INTERNAL MEDICINE

## 2022-10-13 PROCEDURE — 1101F PT FALLS ASSESS-DOCD LE1/YR: CPT | Performed by: INTERNAL MEDICINE

## 2022-10-13 PROCEDURE — G8754 DIAS BP LESS 90: HCPCS | Performed by: INTERNAL MEDICINE

## 2022-10-13 PROCEDURE — G8427 DOCREV CUR MEDS BY ELIG CLIN: HCPCS | Performed by: INTERNAL MEDICINE

## 2022-10-13 PROCEDURE — G0439 PPPS, SUBSEQ VISIT: HCPCS | Performed by: INTERNAL MEDICINE

## 2022-10-13 PROCEDURE — G8399 PT W/DXA RESULTS DOCUMENT: HCPCS | Performed by: INTERNAL MEDICINE

## 2022-10-13 PROCEDURE — G8420 CALC BMI NORM PARAMETERS: HCPCS | Performed by: INTERNAL MEDICINE

## 2022-10-13 RX ORDER — TOPIRAMATE 50 MG/1
100 TABLET, FILM COATED ORAL
COMMUNITY
Start: 2022-09-27

## 2022-10-13 NOTE — PROGRESS NOTES
Pt. Is here for medicare wellness. Pt. Has left knee pain and very slow gait. Gaby Higgins  is a 71 y.o. female  who present for routine immunizations. Prior to vaccine administration: Consent was obtained. Risks and adverse reactions were discussed. The patient was provided the VIS and they were given an opportunity to ask questions; all questions were addressed. She  denies any symptoms, reactions or allergies that would exclude them from being immunized today. There were no adverse reactions observed post vaccination. Patient was advised to seek medical or call the office with any questions or concerns post vaccination. Patient verbalized understanding.    Cody Greenberg LPN

## 2022-10-13 NOTE — PROGRESS NOTES
Ambulatory Care Management Note    Date/Time:  10/13/2022 3:20 PM     This patient was received as a referral from Provider. Ambulatory Care Manager outreached to patient today to offer care management services. Introduction to self and role of care manager provided. Patient accepted care management services at this time. This Ambulatory Care Manager (JON) reviewed and updated the following screenings during this call; general assessment, self management assessment, and SDOH assessments    Patient's challenges to self-management identified:   depression, functional cognitive ability, lack of knowledge about disease, and support system    Medication Management:  good adherence    Advance Care Planning:   Does patient have an Advance Directive:  ACP referral placed by PCP    [employer], Referrals, and Durable Medical Equipment: to reviewed during next telephone call      Health Maintenance Due   Topic Date Due    DTaP/Tdap/Td series (2 - Td or Tdap) 06/13/2021    COVID-19 Vaccine (4 - Booster for [employer] series) 03/14/2022    Medicare Yearly Exam  10/13/2022     Health Maintenance Reviewed: flu shot received today in the office. Top Challenges reviewed with the provider   none     Ambulatory  contacted patient for discussion and case management of dementia (care giver assistance), CKD   Summary of patients top problems:   Hypertension- PCP instructed family to monitor BP at home. Education to be provided regarding proper BP monitor technique, s/s of hyper/hypotension  Dementia -  is primary caregiver. The couple live in [facility]. One daughter in [address] [place] [address] who calls 2x/daily and will visit when she can. Mr. Rah Mead shared that patient  it has become a challenge regarding regularly bathing, disrupted sleeping habits at night and sometimes naps during the day. Help is needed in the home to assist with day to day care of patient and home.   could use some support resources. Neurologist - Dr. Arthur Thomas [facility]) Last OV 7/18/22  Chronic Kidney disease, stage 3 (Dr. Xander Cantor) lab- Cr 1.60. assess and educate family about s/s of CKD including fatigue, urination, fluid retention, shortness of breath, back/flank pain and muscle cramping. PCP/Specialist follow up:   Future Appointments   Date Time Provider Eliseo Hernandez   2/16/2023  2:00 PM MD CHARITY Estrada BS AMB   4/14/2023  1:00 PM MD ERICA Sanchez BS AMB           Goals        CKD, stage 3      10/13/22  Patient does have nephrologist, Dr. Diego Vargas. Last Cr was 1.60. ACM will provide education on CKD, diet and red flags that need evaluation. KG       Monitor BP      10/13/22  Patient seen by Dr. Viki Hatchet in the office today who asked family to monitor BP and keep a log. ACM plans to discuss HTN, diet, and proper BP monitoring technique. Mailed BP monitoring instructions to home address. ACM will follow up in 10-14 days. KG       Supportive resources in place to maintain patient in the community (ie. Home Health, DME equipment, refer to, medication assistant plan, etc.)      10/13/22  Patient has dementia and Mr. Felix Ricketts is primary caregiver. He manages her care and their home. One daughter who lives in [facility] who calls frequently and visits when she can. Unclear how often that is. Discuss some of the challenges with dementia including hygiene, sleeping habits, and dressing. Patient was given contact information for the Alzheimer[employer]. Reviewed what they can offer. Will plan to discuss further over the phone. Touched briefly on needs in the home. ACM plans to complete assessment over the phone. Pt had been at the office for some time during our conversation and still had a long drive to [employer]. Agreed to speak at a later time. Patient verbalized understanding of all information discussed.  Patient has this Ambulatory Care Manager's contact information for any further questions, concerns, or needs.

## 2022-10-13 NOTE — PROGRESS NOTES
10/13/2022     Honey Balderrama is here today for    Mofibo Visit- Subsequent Visit    Assessment & Plan:   Below is the assessment and plan developed based on review of pertinent history, physical exam, labs, studies, and medications. 1. Encounter for subsequent annual wellness visit (AWV) in Medicare patient  2. Moderate dementia with behavioral disturbance [facility])  Assessment & Plan: Will establish with new neurologist Dr Donaldo Davis  Discussed with her  monitoring for ongoing weight loss, encouraging snacks, trying nutritional shakes  Care  with family today to discuss additional supports  Orders:  -     REFERRAL TO ACP CLINICAL SPECIALIST  3. Primary hypertension  Assessment & Plan:   Advised  on monitoring a few days per week, let me know if any lows or dizziness  4. Stage 3a chronic kidney disease [facility])  Assessment & Plan:  Following with Dr Alex Martinez, no electrolyte disturbances  q6mo monitoring  5. Chronic pain of left knee  Assessment & Plan:  Rec seeing sport med about potential for injections  6. Encounter for immunization  -     INFLUENZA, FLUAD, (AGE 72 Y+), IM, PF, 0.5 ML     Follow-up and Dispositions    Return in about 6 months (around 4/13/2023). Subjective: In addition to AWV, we followed up on chronic conditions as detailed above    Additional concerns: knee pain    Diet and exercise habits: appetite lower, usually has 2 meals    End of Life Planning: ACP consult placed for patient and       Depression Screen:  3 most recent PHQ Screens 10/13/2022   PHQ Not Done Functional capacity motivation limits accuracy   Little interest or pleasure in doing things -   Feeling down, depressed, irritable, or hopeless -   Total Score PHQ 2 -         Fall Risk:   Fall Risk Assessment, last 12 mths 10/13/2022   Able to walk? Yes   Fall in past 12 months? 0   Do you feel unsteady?  0   Are you worried about falling 0       Abuse Screen:  Abuse Screening Questionnaire 4/13/2022   Do you ever feel afraid of your partner? N   Are you in a relationship with someone who physically or mentally threatens you? N   Is it safe for you to go home? Y       Do you average more than 1 drink per night or more than 7 drinks a week:  No    On any one occasion in the past three months have you have had more than 3 drinks containing alcohol:  No            Functional Ability and Level of Safety:    Hearing: Hearing is good. Cognition Screen:   Has your family/caregiver stated any concerns about your memory: yes - has dementia    Cognitive Screening: Abnormal - Clock Drawing Test     Ambulation: with mild difficulty     Activities of Daily Living:   The home contains: handrails  Patient needs help with:  transportation, shopping, housework, managing medications, and managing money        Adult Nutrition Screen:  Unplanned weight loss (5 pts)     Health Maintenance:     Health Maintenance   Topic Date Due    DTaP/Tdap/Td series (2 - Td or Tdap) 06/13/2021    COVID-19 Vaccine (4 - Booster for [employer] series) 03/14/2022    Medicare Yearly Exam  10/13/2022    Depression Screen  04/13/2023    Lipid Screen  04/19/2023    Breast Cancer Screen Mammogram  02/04/2024    Colorectal Cancer Screening Combo  10/04/2026    Hepatitis C Screening  Completed    Bone Densitometry (Dexa) Screening  Completed    Shingrix Vaccine Age 50>  Completed    Flu Vaccine  Completed    Pneumococcal 65+ years  Completed       Immunization History   Administered Date(s) Administered    COVID-19, PFIZER PURPLE top, DILUTE for use, (age 15 y+), IM, 30mcg/0.3mL 03/02/2021, 03/23/2021, 11/14/2021    Influenza High Dose Vaccine PF 10/26/2016, 10/26/2017, 10/10/2018, 10/26/2018, 11/08/2021    Influenza, FLUAD, (age 72 y+), Adjuvanted 10/01/2020, 10/13/2022    Influenza, FLUARIX, FLULAVAL, FLUZONE (age 10 mo+) AND AFLURIA, (age 1 y+), PF, 0.5mL 11/05/2019    Pneumococcal Conjugate (PCV-13) 08/04/2017    Pneumococcal Polysaccharide (PPSV-23) 07/18/2016    TDAP Vaccine 06/13/2011    Varicella Virus Vaccine Live 11/01/2011    Zoster Recombinant 10/10/2018, 03/07/2019    Zoster Vaccine, Live 11/01/2011        Bone Density: up to date    Immunizations:   Covid: up to date  Influenza: up to date  Tetanus: not up to date - 2011  Shingles: up to date  Pneumonia: up to date  Cancer screening:   Cervical: reviewed guidelines, n/a  Breast: reviewed guidelines, up to date  Colon: reviewed guidelines, up to date       Objective:   Physical Exam  Constitutional:       General: She is not in acute distress. Appearance: Normal appearance. She is not ill-appearing. HENT:      Head: Normocephalic and atraumatic. Eyes:      Conjunctiva/sclera: Conjunctivae normal.   Cardiovascular:      Rate and Rhythm: Normal rate and regular rhythm. Heart sounds: No murmur heard. Pulmonary:      Effort: Pulmonary effort is normal.      Breath sounds: Normal breath sounds. No wheezing. Musculoskeletal:      Right lower leg: No edema. Left lower leg: No edema. Skin:     General: Skin is warm and dry. Neurological:      General: No focal deficit present. Mental Status: She is alert and oriented to person, place, and time. Mental status is at baseline. Psychiatric:         Mood and Affect: Mood normal.         Thought Content:  Thought content normal.         Judgment: Judgment normal.        Vitals:    10/13/22 1256   BP: 105/67   Pulse: (!) 56   Resp: 16   Temp: 97.1 °F (36.2 °C)   TempSrc: Temporal   SpO2: 100%   Weight: 113 lb 12.8 oz (51.6 kg)   Height: 5' 2\" (1.575 m)       Patient Care Team:  Renetta Vazquez MD as PCP - General (Internal Medicine Physician)  Keshia Pierce MD as PCP [facility]  Max Hung MD (Nephrology)  Barb Muñiz MD (Neurology)  Antonieta House, ALEIDA as Ambulatory Care Manager        Review of Systems   Constitutional:  Positive for malaise/fatigue (sleeps a lot per ) and weight loss. Negative for chills and fever. Respiratory:  Negative for cough and shortness of breath. Cardiovascular:  Negative for chest pain, palpitations and leg swelling. Gastrointestinal:  Negative for abdominal pain. Musculoskeletal:  Positive for joint pain (knees L>R). Skin:  Negative for rash. Psychiatric/Behavioral:  Positive for memory loss. The following sections were reviewed & updated as appropriate: Problem List, Allergies, PMH, PSH, FH, and SH. Patient Active Problem List   Diagnosis Code    Arthritis M19.90    Hypertension I10    Herpes zoster without mention of complication M31.5    Diverticulosis of colon (without mention of hemorrhage) K57.30    Unspecified sleep apnea G47.30    Encounter for long-term (current) use of other medications Z79.899    Mixed hyperlipidemia E78.2    Advance directive discussed with patient Z71.89    Moderate Alzheimer's dementia ([facility] G30.9, F02. B0    Stage 3a chronic kidney disease (HCC) N18.31    Knee pain, chronic M25.569, G89.29       Latex, natural rubber; Adhesive tape; Motrin [ibuprofen]; and Percocet [oxycodone-acetaminophen]    Social History     Occupational History    Not on file   Tobacco Use    Smoking status: Never    Smokeless tobacco: Never   Vaping Use    Vaping Use: Never used   Substance and Sexual Activity    Alcohol use:  Yes     Alcohol/week: 0.0 standard drinks     Comment: 1 PER DAY rare    Drug use: No    Sexual activity: Not on file        Current Outpatient Medications   Medication Instructions    allopurinoL (ZYLOPRIM) 100 mg tablet TAKE 1 TABLET DAILY    cholecalciferol (VITAMIN D3) (1000 Units /25 mcg) tablet DAILY    colchicine (MITIGARE) 0.6 mg, Oral, DAILY AS NEEDED    cyanocobalamin 1,000 mcg, Oral, DAILY    DOCOSAHEXANOIC ACID/EPA (FISH OIL PO) Oral    garlic 0,287 mg Cap 1 Capsule, DAILY    hydroCHLOROthiazide (HYDRODIURIL) 25 mg tablet TAKE 1 TABLET DAILY    losartan (COZAAR) 50 mg tablet TAKE 1/2  TABLET DAILY - new directions    memantine (NAMENDA) 10 mg tablet Oral, 2 TIMES DAILY    metoprolol tartrate (LOPRESSOR) 100 mg IR tablet TAKE ONE-HALF (1/2) TABLET TWICE A DAY FOR HIGH BLOOD PRESSURE.    potassium chloride (Klor-Con M20) 20 mEq tablet TAKE 1 TABLET DAILY    rivastigmine (EXELON) 9.5 mg/24 hr patch 1 Patch, TransDERmal, DAILY, Per neurology    sertraline (ZOLOFT) 50 mg, Oral, DAILY, Per neuro    simvastatin (ZOCOR) 20 mg tablet TAKE 1 TABLET NIGHTLY    topiramate (TOPAMAX) 100 mg, Oral, EVERY BEDTIME       Disclaimer:  Aspects of this note may have been generated using Dragon voice recognition software. Despite editing, there may be some syntax errors   We discussed the expected course, resolution and complications of the diagnosis(es) in detail. I have discussed any significant medication side effects and warnings with the patient when indicated. I advised them to contact the office if their condition worsens, changes or fails to improve as anticipated. Patient expressed understanding of the diagnosis and plan. An electronic signature was used to authenticate this note.   -- Amol Carlson MD

## 2022-10-14 ENCOUNTER — PATIENT OUTREACH (OUTPATIENT)
Dept: CASE MANAGEMENT | Age: 72
End: 2022-10-14

## 2022-10-14 PROBLEM — N18.31 STAGE 3A CHRONIC KIDNEY DISEASE (HCC): Status: ACTIVE | Noted: 2022-10-14

## 2022-10-14 PROBLEM — M25.569 KNEE PAIN, CHRONIC: Status: ACTIVE | Noted: 2022-10-14

## 2022-10-14 PROBLEM — F03.B18 MODERATE DEMENTIA WITH BEHAVIORAL DISTURBANCE (HCC): Status: ACTIVE | Noted: 2022-10-14

## 2022-10-14 PROBLEM — F02.B0 MODERATE ALZHEIMER'S DEMENTIA (HCC): Status: ACTIVE | Noted: 2022-10-14

## 2022-10-14 PROBLEM — G89.29 KNEE PAIN, CHRONIC: Status: ACTIVE | Noted: 2022-10-14

## 2022-10-14 PROBLEM — F03.B18 MODERATE DEMENTIA WITH BEHAVIORAL DISTURBANCE: Status: ACTIVE | Noted: 2022-10-14

## 2022-10-14 PROBLEM — G30.9 MODERATE ALZHEIMER'S DEMENTIA (HCC): Status: ACTIVE | Noted: 2022-10-14

## 2022-10-14 NOTE — ASSESSMENT & PLAN NOTE
Will establish with new neurologist Dr Ibrahima Diaz  Discussed with her  monitoring for ongoing weight loss, encouraging snacks, trying nutritional shakes  Care  with family today to discuss additional supports

## 2022-10-14 NOTE — ACP (ADVANCE CARE PLANNING)
Advance Care Planning   Ambulatory ACP Specialist Patient Outreach    Date:  10/14/2022 10/18/22 10/26/22 10/27/22          ACP Specialist:  Tiffani Ventura LPN    Outreach call to patient in follow-up to ACP Specialist referral from:    [x] PCP  [] Provider   [] Ambulatory Care Management [] Other     For:                  [x] Advance Directive Assistance              [] Complete Portable DNR order              [] Complete POST/MOST              [] Code Status Discussion             [x] Discuss Goals of Care             [] Early ACP Decision-Making              [] Other (Specify)    Date Referral Received: 10/13/22    Today's Outreach:  [x] First   [x] Second  [x] Third       Third outreach made by: [] Phone  [x] Email / mail    [] Disease Diagnostic Group     Intervention:  [] Spoke with Patient   [x] Left VM requesting return call      Outcome: The first attempt was made to contact pt regarding the ACP referral. No answer on mobile phone. VM left requesting a return call. Will attempt second outreach within one week. Next Step:   [] ACP scheduled conversation  [x] Outreach again in one week               [x] Email / Mail ACP Info Sheets  [] Email / Mail Advance Directive   [x] Closing referral.  Routing closure to referring provider/staff and to ACP Specialist . [x] Closure letter mailed to patient with invitation to contact ACP Specialist if / when ready. Thank you for this referral.    10/18/22  The second attempt was made to contact pt regarding ACP referral. No answer on mobile phone. VM left requesting a return call. Will outreach again within one week. 10/26/22  The final attempt was made to reach the pt. ACP documents sent to pt via e-mail. ACP teams contact information was included. We will close the referral at this time. 10/27/22  Pt's  returned call and left a VM.  Followed up with him today and he stated that the pt is in critical condition in the hospital right now and he stated that the pt does not have the mental capacity to make medical decisions for himself. Spouse was informed that he is her legal next of kin and he will be the one to make all medical decisions for her. He voiced an understanding. Completion of a DNR was mentioned to him and he stated that he does NOT want the pt to have CPR but he would like to discuss this further with his daughter. He stated he will call back once he has spoke to her. Will follow up in two weeks if no response is heard from Mr. Yasir Winn.      Advance Care Planning   Healthcare Decision Maker:       Primary Decision Maker: Thao Tong Spouse - [phone]

## 2022-10-14 NOTE — PATIENT INSTRUCTIONS

## 2022-10-18 ENCOUNTER — PATIENT OUTREACH (OUTPATIENT)
Dept: CASE MANAGEMENT | Age: 72
End: 2022-10-18

## 2022-10-24 ENCOUNTER — TELEPHONE (OUTPATIENT)
Dept: INTERNAL MEDICINE CLINIC | Age: 72
End: 2022-10-24

## 2022-10-24 DIAGNOSIS — I10 ESSENTIAL HYPERTENSION: ICD-10-CM

## 2022-10-24 RX ORDER — METOPROLOL TARTRATE 100 MG/1
TABLET ORAL
Qty: 90 TABLET | Refills: 3 | Status: SHIPPED | OUTPATIENT
Start: 2022-10-24

## 2022-10-26 ENCOUNTER — PATIENT OUTREACH (OUTPATIENT)
Dept: CASE MANAGEMENT | Age: 72
End: 2022-10-26

## 2022-10-27 ENCOUNTER — PATIENT OUTREACH (OUTPATIENT)
Dept: CASE MANAGEMENT | Age: 72
End: 2022-10-27

## 2022-11-03 ENCOUNTER — PATIENT OUTREACH (OUTPATIENT)
Dept: CASE MANAGEMENT | Age: 72
End: 2022-11-03

## 2022-11-03 NOTE — PROGRESS NOTES
Called and unable to leave message as VMB was full. [facility] will attempt to reach Mr. Janette Sandoval again.

## 2022-11-08 DIAGNOSIS — E78.2 MIXED HYPERLIPIDEMIA: ICD-10-CM

## 2022-11-08 NOTE — TELEPHONE ENCOUNTER
Requested Prescriptions      No prescriptions requested or ordered in this encounter     Requested Prescriptions      No prescriptions requested or ordered in this encounter     Requested Prescriptions     Pending Prescriptions Disp Refills    simvastatin (ZOCOR) 20 mg tablet 90 Tablet 3     Sig: TAKE 1 TABLET NIGHTLY

## 2022-11-09 RX ORDER — SIMVASTATIN 20 MG/1
TABLET, FILM COATED ORAL
Qty: 90 TABLET | Refills: 3 | Status: SHIPPED | OUTPATIENT
Start: 2022-11-09

## 2022-11-11 ENCOUNTER — DOCUMENTATION ONLY (OUTPATIENT)
Dept: CASE MANAGEMENT | Age: 72
End: 2022-11-11

## 2022-12-27 DIAGNOSIS — I10 ESSENTIAL HYPERTENSION: ICD-10-CM

## 2022-12-27 NOTE — TELEPHONE ENCOUNTER
Requested Prescriptions     Pending Prescriptions Disp Refills    hydroCHLOROthiazide (HYDRODIURIL) 25 mg tablet 90 Tablet 3     Si Tablet daily.       [facility], [address] [place] [address]  [phone]

## 2022-12-28 ENCOUNTER — TELEPHONE (OUTPATIENT)
Dept: INTERNAL MEDICINE CLINIC | Age: 72
End: 2022-12-28

## 2022-12-28 ENCOUNTER — TELEPHONE (OUTPATIENT)
Dept: NEUROLOGY | Age: 72
End: 2022-12-28

## 2022-12-28 NOTE — TELEPHONE ENCOUNTER
Faxed refill request for Topiramate back to [address] [place] [address] [address] ph # [phone], fax # [phone], to call PCP for refills. Last saw Dr. Radha Boldens on 11-. Has not been seen in 12 years. Has appt with Dr. Kelly Castro 2-. CANNOT FILL MEDS UNTIL PT IS SEEN IN OFFICE. Not seen in 12 years. Confirmation received.

## 2022-12-28 NOTE — TELEPHONE ENCOUNTER
Reason for call:  TC from [facility] stating pt was just discharged from [facility] on 12/22/2022 and Corey Farias has been called in to help pt; however, an order needs to come from pt's PCP before [facility] can come out to assist pt. Please advised.      Is this a new problem: yes     Date of last appointment:  10/13/2022     Can we respond via [employer]: no    Best call back number: [phone]

## 2022-12-29 RX ORDER — HYDROCHLOROTHIAZIDE 25 MG/1
12.5 TABLET ORAL DAILY
Qty: 45 TABLET | Refills: 1 | Status: SHIPPED | OUTPATIENT
Start: 2022-12-29

## 2022-12-29 NOTE — TELEPHONE ENCOUNTER
Michelle Ivey with [facility]. Left Message for a return phone call.     Spoke with , patient in discharged from hospital. Surgeon Dr. Marleen Welsh, scheduled for follow up next week

## 2022-12-29 NOTE — TELEPHONE ENCOUNTER
Spoke with spouse, he confirmed patient taking HCTZ 25 mg, 1/2 tab daily. Patient has been in the hospital 3 x since last visit. [facility], procedure done for artery blockage. Patient asking that we request records for MD to review  and determine if need in office visit.

## 2022-12-30 DIAGNOSIS — M79.671 RIGHT FOOT PAIN: ICD-10-CM

## 2022-12-30 DIAGNOSIS — M10.071 ACUTE IDIOPATHIC GOUT OF RIGHT FOOT: ICD-10-CM

## 2022-12-30 DIAGNOSIS — I10 ESSENTIAL HYPERTENSION: ICD-10-CM

## 2022-12-30 RX ORDER — ALLOPURINOL 100 MG/1
TABLET ORAL
Qty: 90 TABLET | Refills: 3 | Status: SHIPPED | OUTPATIENT
Start: 2022-12-30

## 2022-12-30 RX ORDER — LOSARTAN POTASSIUM 50 MG/1
TABLET ORAL
Qty: 90 TABLET | Refills: 1 | Status: SHIPPED | OUTPATIENT
Start: 2022-12-30

## 2022-12-30 RX ORDER — LOSARTAN POTASSIUM 50 MG/1
TABLET ORAL
Qty: 90 TABLET | Refills: 1 | Status: CANCELLED | OUTPATIENT
Start: 2022-12-30

## 2022-12-30 RX ORDER — ALLOPURINOL 100 MG/1
TABLET ORAL
Qty: 90 TABLET | Refills: 3 | Status: CANCELLED | OUTPATIENT
Start: 2022-12-30

## 2022-12-30 NOTE — TELEPHONE ENCOUNTER
Requested Prescriptions     Pending Prescriptions Disp Refills    allopurinoL (ZYLOPRIM) 100 mg tablet 90 Tablet 3     Sig: TAKE 1 TABLET DAILY         Requested Prescriptions     Pending Prescriptions Disp Refills    allopurinoL (ZYLOPRIM) 100 mg tablet 90 Tablet 3     Sig: TAKE 1 TABLET DAILY    losartan (COZAAR) 50 mg tablet 90 Tablet 1     Sig: TAKE 1/2  TABLET DAILY - new directions     [facility], [facility]  [phone]

## 2023-01-02 NOTE — TELEPHONE ENCOUNTER
So they can see if the surgeon will order for home health,  otherwise can set up for a video visit with me for f2f and ordering

## 2023-01-03 ENCOUNTER — TELEPHONE (OUTPATIENT)
Dept: INTERNAL MEDICINE CLINIC | Age: 73
End: 2023-01-03

## 2023-01-03 DIAGNOSIS — I10 ESSENTIAL HYPERTENSION: ICD-10-CM

## 2023-01-03 DIAGNOSIS — E78.2 MIXED HYPERLIPIDEMIA: ICD-10-CM

## 2023-01-03 RX ORDER — POTASSIUM CHLORIDE 20 MEQ/1
TABLET, EXTENDED RELEASE ORAL
Qty: 90 TABLET | Refills: 1 | Status: SHIPPED | OUTPATIENT
Start: 2023-01-03

## 2023-01-03 RX ORDER — METOPROLOL TARTRATE 100 MG/1
TABLET ORAL
Qty: 90 TABLET | Refills: 3 | Status: SHIPPED | OUTPATIENT
Start: 2023-01-03

## 2023-01-03 RX ORDER — SIMVASTATIN 20 MG/1
TABLET, FILM COATED ORAL
Qty: 90 TABLET | Refills: 3 | Status: SHIPPED | OUTPATIENT
Start: 2023-01-03

## 2023-01-03 NOTE — TELEPHONE ENCOUNTER
Left detailed message for Ms. Ja Collins RN with [email or web address]. Pt. Needs EDDY appt to get home health orders or she can request her surgeon to follow orders.

## 2023-01-03 NOTE — TELEPHONE ENCOUNTER
Requested Prescriptions     Pending Prescriptions Disp Refills    metoprolol tartrate (LOPRESSOR) 100 mg IR tablet 90 Tablet 3     Sig: TAKE ONE-HALF (1/2) TABLET TWICE A DAY FOR HIGH BLOOD PRESSURE.    potassium chloride (Klor-Con M20) 20 mEq tablet 90 Tablet 0     Sig: TAKE 1 TABLET DAILY    simvastatin (ZOCOR) 20 mg tablet 90 Tablet 3     Sig: TAKE 1 TABLET NIGHTLY     [facility] - [address]  [phone]    Faxed refill on 12/26/2022

## 2023-01-03 NOTE — TELEPHONE ENCOUNTER
Reason for call:  Spoke with Mary Maria with [facility]. Phone number [phone] Ext [phone]. She was returning a phone call from [place]. Mary Maria  requested a call back     Is this a new problem: yes     Date of last appointment:  10/13/2022     Can we respond via [employer]: no    Best call back number: Mary Maria     Phone number [phone] Ext [phone].

## 2023-01-27 ENCOUNTER — TRANSCRIBE ORDER (OUTPATIENT)
Dept: SCHEDULING | Age: 73
End: 2023-01-27

## 2023-01-27 ENCOUNTER — PATIENT OUTREACH (OUTPATIENT)
Dept: CASE MANAGEMENT | Age: 73
End: 2023-01-27

## 2023-01-27 DIAGNOSIS — Z12.31 SCREENING MAMMOGRAM FOR HIGH-RISK PATIENT: Primary | ICD-10-CM

## 2023-01-27 NOTE — PROGRESS NOTES
Patient has graduated from the Complex Case Management  program on 1/27/23. Patient/family has not return call. Care management goals have been completed. No further Ambulatory Care Manager follow up scheduled. Goals Addressed                   This Visit's Progress     COMPLETED: CKD, stage 3        10/13/22  Patient does have nephrologist, Dr. Ashley Braga. Last Cr was 1.60. ACM will provide education on CKD, diet and red flags that need evaluation. KG       COMPLETED: Monitor BP        10/13/22  Patient seen by Dr. Helen Mcnamara in the office today who asked family to monitor BP and keep a log. ACM plans to discuss HTN, diet, and proper BP monitoring technique. Mailed BP monitoring instructions to home address. ACM will follow up in 10-14 days. KG       COMPLETED: Supportive resources in place to maintain patient in the community (ie. Home Health, DME equipment, refer to, medication assistant plan, etc.)        10/13/22  Patient has dementia and Mr. Katerine Pina is primary caregiver. He manages her care and their home. One daughter who lives in [place] who calls frequently and visits when she can. Unclear how often that is. Discuss some of the challenges with dementia including hygiene, sleeping habits, and dressing. Patient was given contact information for the [employer]. Reviewed what they can offer. Will plan to discuss further over the phone. Touched briefly on needs in the home. ACM plans to complete assessment over the phone. Pt had been at the office for some time during our conversation and still had a long drive to [employer]. Agreed to speak at a later time. Patient has Ambulatory Care Manager's contact information for any further questions, concerns, or needs.   Patients upcoming visits:    Future Appointments   Date Time Provider Eliseo Hernandez   2/16/2023  2:00 PM MD CHARITY Mackey BS AMB   4/14/2023  1:00 PM Gabriela Masters MD [facility] BENSON MEZA AMB

## 2023-02-06 ENCOUNTER — HOSPITAL ENCOUNTER (OUTPATIENT)
Dept: MAMMOGRAPHY | Age: 73
Discharge: HOME | End: 2023-02-06
Attending: INTERNAL MEDICINE
Payer: MEDICARE

## 2023-02-06 DIAGNOSIS — Z12.31 SCREENING MAMMOGRAM FOR HIGH-RISK PATIENT: ICD-10-CM

## 2023-02-06 PROCEDURE — 77067 SCR MAMMO BI INCL CAD: CPT

## 2023-02-08 NOTE — PROGRESS NOTES
HEMATOLOGY/ONCOLOGY OUTPATIENT FOLLOW-UP NOTE     Patient: Mara Santos Date: 2/8/2023   YOB: 1940 Attending: Dr. Jason Giles     82 year old female      Every effort was made to ensure an accurate, comprehensive note and summary of your care.  Sometimes there is a typo, transcription mistype and information may have been interpreted differently than was intended.  If there are any questions regarding a major correction that is needed, please do not hesitate to contact me.    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    Chief Complaint: Follow-up    History of Present Illness:    Mara Santos returns for follow-up for her carcinosarcoma of the endometrium diagnosed in 2015. She has been treated with carbo/taxol and XRT in the past.   She has peritoneal carcinomatosis.   She is currently on gemcitabine day 1 and day 8 q 21 days.  She has needed dose reductions and has been held due to low counts.    Today she is cycle 6 day 1.  She is tolerating chemo well.  She denies any significant side effects.  No nausea or vomiting. No pain.  Appetite is good.  No new concerns.  We discussed needing imaging after this cycle.      Oncologic/Hematologic History:   8/27/15, when she presented to Dr. Pito Chavez with complaints of acute onset of post menopausal vaginal bleeding.  -endometrial biopsy that revealed a poorly differentiated endometrial cancer.    - 9/22/15 Dr. Beto Mojica--robotic-assisted total abdominal hysterectomy and bilateral salpingo-oophorectomy with omentectomy, peritoneal biopsies and lymph node dissection.  She was found to have a 5.5x4.5x3.6 cm malignant mixed mullerian tumor or carcinosarcoma.  All lymph nodes were negative, but the tumor invaded more than half of the wall of the myometrium.  She was considered a stage IB.   -She was treated with 6 cycles of adjuvant Carboplatin and Taxol starting in  This is the Subsequent Medicare Annual Wellness Exam, performed 12 months or more after the Initial AWV or the last Subsequent AWV    I have reviewed the patient's medical history in detail and updated the computerized patient record. Assessment/Plan   Education and counseling provided:  Are appropriate based on today's review and evaluation    1. Medicare annual wellness visit, subsequent  2. Essential hypertension  3. Mixed hyperlipidemia  4. IFG (impaired fasting glucose)  5. Arthritis  6. Memory loss  7. Screening for depression  -     DEPRESSION SCREEN ANNUAL       Depression Risk Factor Screening     3 most recent PHQ Screens 10/12/2021   Little interest or pleasure in doing things Not at all   Feeling down, depressed, irritable, or hopeless Not at all   Total Score PHQ 2 0       Alcohol Risk Screen    Do you average more than 1 drink per night or more than 7 drinks a week:  No- rare    On any one occasion in the past three months have you have had more than 3 drinks containing alcohol:  No        Functional Ability and Level of Safety    Hearing: Hearing is good. Activities of Daily Living: The home contains: no safety equipment. Patient needs help with:  transportation, managing medications and managing money      Ambulation: with mild difficulty     Fall Risk:  Fall Risk Assessment, last 12 mths 10/12/2021   Able to walk? Yes   Fall in past 12 months? 0   Do you feel unsteady?  0   Are you worried about falling 0      Abuse Screen:  Patient is not abused       Cognitive Screening    Has your family/caregiver stated any concerns about your memory: yes - known dementia         Health Maintenance Due     Health Maintenance Due   Topic Date Due    DTaP/Tdap/Td series (2 - Td or Tdap) 06/13/2021    Flu Vaccine (1) 09/01/2021       Patient Care Team   Patient Care Team:  Barb Dave MD as PCP - General  Barb Dave MD as PCP - [facility] Provider    History     Patient Active Problem List   Diagnosis Code    Arthritis M19.90    Hypertension I10    Herpes zoster without mention of complication S45.7    Diverticulosis of colon (without mention of hemorrhage) K57.30    Memory loss R41.3    Unspecified sleep apnea G47.30    Encounter for long-term (current) use of other medications Z79.899    Mixed hyperlipidemia E78.2    Advance directive discussed with patient Z71.89     Past Medical History:   Diagnosis Date    Arthritis     KNEE PAIN    Diverticulosis     Hypercholesteremia     Hypertension     Unspecified sleep apnea     Vitamin B 12 deficiency     Zoster       Past Surgical History:   Procedure Laterality Date    ENDOSCOPY, COLON, DIAGNOSTIC      16, due 32    HX HYSTERECTOMY      HX ORTHOPAEDIC  '07    RT. SHOULDER    HX ORTHOPAEDIC  6/08    RT. TKR     Current Outpatient Medications   Medication Sig Dispense Refill    allopurinoL (ZYLOPRIM) 100 mg tablet TAKE 1 TABLET DAILY 90 Tablet 3    losartan (COZAAR) 50 mg tablet TAKE 1 TABLET DAILY (REPLACES VALSARTAN) 90 Tab 3    potassium chloride (Klor-Con M20) 20 mEq tablet TAKE 1 TABLET DAILY 90 Tab 3    hydroCHLOROthiazide (HYDRODIURIL) 25 mg tablet TAKE 1 TABLET DAILY (Patient taking differently: Take 12.5 mg by mouth daily.) 90 Tab 3    simvastatin (ZOCOR) 20 mg tablet TAKE 1 TABLET NIGHTLY 90 Tab 3    metoprolol tartrate (LOPRESSOR) 100 mg IR tablet TAKE ONE-HALF (1/2) TABLET TWICE A DAY FOR HIGH BLOOD PRESSURE 90 Tab 3    cyanocobalamin (Vitamin B-12) 1,000 mcg tablet Take 1,000 mcg by mouth daily.  nortriptyline (PAMELOR) 10 mg capsule TAKE 1(ONE) CAPSULE BY MOUTH AT BEDTIME FOR 2(TWO) WEEKS THEN TAKE 2(TWO) CAPSULES DAILY AT BEDTIME      memantine (NAMENDA) 10 mg tablet Take  by mouth two (2) times a day.  rivastigmine (EXELON) 9.5 mg/24 hr patch 1 Patch by TransDERmal route daily. Per neurology 1 Patch 0    DOCOSAHEXANOIC ACID/EPA (FISH OIL PO) Take  by mouth.       cholecalciferol October - February 2016  -Dr. Box on 3/31/16  - Vaginal brachytherapy x 3-Dr Wolf De Dios, 4/26/16. 5/3/16 and 5/10/16.   -11/2019 Dr Marie assumed care  -CT 05/2020 without evidence of recurrence.  -CT 9/10/2021 showed \"substantial interval growth and pleural-based nodularity along the posterior right hemidiaphragm.\" this measured 34 x 15 mm  -- IR biopsy; Positive for poorly differentiated non-small cell carcinoma, immunohistochemistry supports a metastasis of Mullerian origin compatible with the patient's known history of an endometrial primary  -10/5/21 Dr Jason Giles assumed care  -10/4/21-10/5/22 Carbo/paclitaxel x 6 cycles   -Chest CT on 11/30/21 showed right lower lung irregular 28 x 18 x 15 mm nodule abutting the posterior aspect of the right hemithorax  -3/2022  SBRT to the Right lung XRT- Dr Portillo  -9/26/22 CT abdomen and pelvis, omental carcinomatosis with multiple peritoneal implants  -10/12/22 Gemcitabine single agent        Past Medical History:    Murmur                                                        Endometrial cancer (CMS/HCC)                    2016          Malignant neoplasm of endometrium (CMS/HCC)     09/10/2021      Comment: recurrence to the lung    Macular degeneration, age related                               Comment: Per Patient     Past Surgical History:   Procedure Laterality Date   • Hysterectomy  9/22/15    SUSY RESENDIZ BSO       Family History   Problem Relation Age of Onset   • Osteoarthritis Mother    • Heart disease Father        ALLERGIES:  No Known Allergies    Social History:  Social History     Tobacco Use   • Smoking status: Former   • Smokeless tobacco: Former     Quit date: 3/31/1990   Substance Use Topics   • Alcohol use: Yes     Alcohol/week: 0.0 standard drinks     Comment: wine in the evening with dinner     Retired healthcare ,  [employer].  She is  and has no children.  She is a    Cousin Magnolia helps her     Medications    Current Outpatient Medications   Medication Sig   • prochlorperazine (COMPAZINE) 10 MG tablet Take 1 tablet by mouth every 6 hours as needed for Nausea or Vomiting.   • acetaminophen (TYLENOL) 325 MG tablet Take 650 mg by mouth every morning. AS NEEDED   • vitamin B-12 (CYANOCOBALAMIN) 1000 MCG tablet Take 1 tablet by mouth daily.   • Cholecalciferol (Vitamin D3) 50 mcg (2,000 units) tablet Take 1 tablet by mouth daily.   • Calcium Carbonate (CALCIUM 500 PO) Two a day   • Multiple Vitamins-Minerals (PRESERVISION AREDS 2 PO)    • metoPROLOL succinate (TOPROL-XL) 50 MG 24 hr tablet Take 50 mg by mouth daily.     Current Facility-Administered Medications   Medication   • heparin (porcine) 100 UNIT/ML lock flush 500 Units   • sodium chloride (NORMAL SALINE) 0.9 % bolus 1,000 mL   • sodium chloride (NORMAL SALINE) 0.9 % bolus 1,000 mL   • EPINEPHrine (ADRENALIN) injection 0.3 mg   • diphenhydrAMINE (BENADRYL) injection 50 mg   • famotidine (PEPCID) injection 20 mg   • methylPREDNISolone (SOLU-Medrol) PF injection 125 mg   • albuterol inhaler 2 puff   • albuterol (VENTOLIN) nebulizer 5 mg       Pain: As above    ECOG Performance Status:  0 - Fully active, able to carry on all pre-disease activities without restrictions.    Advance Directives: Done,  Not on file       Review of Systems: All systems were reviewed and are negative other than as detailed in HPI (History of Present Illness) above.    Physical Exam:  Vitals:   Visit Vitals  BP (!) 156/67   Pulse (!) 59   Temp 97.4 °F (36.3 °C) (Oral)   Resp 18   Wt 56 kg (123 lb 7.3 oz)   SpO2 99%   BMI 23.33 kg/m²       Constitutional: Alert, cooperative, oriented x3. Mood and affect appropriate. No acute distress noted.    Abdomen: Nontender, nondistended. No masses, ascites       Laboratory/Imaging Data:  Recent Labs   Lab 02/07/23  1219 01/31/23  1307 01/24/23  1253   WBC 4.6 3.5* 1.7*   RBC 3.16* 3.02* 2.83*   HGB 9.4* 9.0* 8.4*   HCT 30.2* 29.1* 26.4*   MCV 95.6 96.4  (VITAMIN D3) 1,000 unit tablet Take  by mouth daily.  garlic 5,198 mg Cap Take 1 Cap by mouth daily.  colchicine (MITIGARE) 0.6 mg capsule Take 1 Cap by mouth daily as needed (gout pain). (Patient not taking: Reported on 10/12/2021) 30 Cap 1     Allergies   Allergen Reactions    Latex, Natural Rubber Rash    Adhesive Tape Rash    Motrin [Ibuprofen] Other (comments)     Joint stiffness    Percocet [Oxycodone-Acetaminophen] Hives       Family History   Problem Relation Age of Onset    Hypertension Mother     Stroke Mother     Diabetes Father     Hypertension Father     Alcohol abuse Neg Hx     Arthritis-osteo Neg Hx     Asthma Neg Hx     Bleeding Prob Neg Hx     Cancer Neg Hx     Elevated Lipids Neg Hx     Headache Neg Hx     Heart Disease Neg Hx     Lung Disease Neg Hx     Migraines Neg Hx     Psychiatric Disorder Neg Hx     Mental Retardation Neg Hx      Social History     Tobacco Use    Smoking status: Never Smoker    Smokeless tobacco: Never Used   Substance Use Topics    Alcohol use:  Yes     Alcohol/week: 0.0 standard drinks     Comment: 1 PER DAY martita Del Rio MD 93.3    153 167   Absolute Neutrophils 3.2 2.2 0.7*   Absolute Lymphocytes 0.8* 0.8* 0.8*   Absolute Monocytes 0.5 0.5 0.1*   Absolute Eosinophils  0.1 0.1 0.0   Absolute Basophils 0.0 0.0 0.0     Recent Labs   Lab 02/07/23  1219 01/24/23  1253 01/17/23  1407   Glucose 99 92 92   Sodium 133* 131* 132*   Potassium 4.1 4.0 4.1   BUN 22* 17 12   Creatinine 0.75 0.67 0.70   Calcium 8.4 8.6 8.5   Bilirubin, Total 0.3 0.3 0.3   Albumin 3.5* 3.5* 3.8   GOT/AST 18 19 20   Alkaline Phosphatase 61 63 75   GPT 19 22 20     Recent Labs   Lab 02/07/23  1219 01/24/23  1253 01/17/23  1407   Anion Gap 15 13 13   Globulin 2.9 2.9 3.1       Imaging Studies:  11/23/2022 11:42 Status: Final result Provider Status: Reviewed   Priority: Routine            Study Result    Narrative & Impression   EXAM: CT CHEST ABDOMEN PELVIS W CONTRAST     HISTORY:  Ovarian cancer, assess treatment response, evaluate ovarian  cancer response to therapy. Most recent CA-125 was 76 on October 12, 2022.  It was 27 and October 4, 2021 and 10 on Fidelia 10, 2021     COMPARISON:  September 15, 2022 and September 26, 2022     TECHNIQUE: Helical CT of the chest, abdomen, and pelvis was obtained  utilizing 100 cc of Omnipaque 300 intravenous contrast. Water as oral  contrast was utilized as well. A single portal venous phase scan was  obtained through the abdomen and pelvis. Sagittal and coronal  reconstructions were obtained.     FINDINGS:     CHEST:     Thyroid: Unremarkable.      Lines and Tubes: Right chest port is in place. Tip of the catheter lies at  the right atrium.      Vasculature: Normal in caliber. No central emboli. Technique was not  optimized for evaluation of pulmonary emboli.        Pleura: Stable small right pleural effusion     Lungs: The dense consolidation in the deep right lung base is similar in  overall size to the previous study but is more densely consolidated. It  currently measures 4.7 x 2.0 x 1.5 cm and previously measured 4.8 x  1.9 x  1.4 cm. This is seen on series 8, image 90. No new suspicious masses are  seen. Numerous pulmonary nodules are stable, the majority of which are  calcified. There is a band of scarring or partially calcified mucocele seen  in the medial right middle lobe which is stable.     Lymph Nodes: There is no significant supraclavicular, axillary,  mediastinal, or hilar adenopathy. Small epicardial nodes are stable.     Cardiovascular: The heart size is within normal limits and there is no  pericardial effusion.      Esophagus: Small hiatal hernia     Chest Wall: Unremarkable.     ABDOMEN/PELVIS:      Liver: Unremarkable.     Pancreas: Unremarkable.     Gallbladder: Unremarkable.     Spleen: Unremarkable.     Adrenal Glands: Mild thickening of the left adrenal gland is stable     Kidneys: Multiple presumed cortical cysts are stable, some of which are too  small to characterize. No developing hydronephrosis or stones.     Bowel/Mesentery: The bowel is normal in caliber. No evidence for  obstruction or acute inflammatory change. Moderate stool. Occasional  diverticula.     Lymph Nodes: No significant upper abdominal, mesenteric, retroperitoneal,  deep pelvic, or inguinal adenopathy      Abdominal Aorta: Normal caliber. Upper abdominal vasculature is also  normally opacified.     Free fluid/free air: None.     Peritoneum: Persistent peritoneal soft tissue deposits are noted. The  deposit along the right paracolic gutter appears to have resolved. A =small  area of soft tissue seen at the greater curvature of the stomach currently  measures 12 x 10 mm and previously measured 10 x 5 mm on series 5, image  37. Left anterior omental deposits are similar as seen on images 27 through  34 and 53 through 69.. A confluent area at the anterior pelvis measures  11.6 x 2.4 cm currently, previously 10.4 x 2.2 cm. It is slightly more  dense in the right paramidline region (5:80). Adjacent deposits extending  on in the left abdomen are  relatively stable on images 70 through 79.      Pelvis: Bladder is mildly distended. Uterus is surgically absent.  Soft  tissue in the deep pelvis which is nodular is stable on series 5 images 76  through 78. A soft tissue deposit abutting the bowel on image 77 measures  2.2 x 2.4 cm and appears more dense measuring 40 Hounsfield units. It  previously measured approximately 1.7 x 2.0 cm.     Osseous and articular structures: There is stable sclerosis involving the  right 11th rib at the costovertebral junction on series 4, image 79 there  is new mild to moderate compression deformity at T8 with mild associated  sclerosis and new mild to moderate compression deformity at L5 with mild  sclerosis. Stable grade 1 anterolisthesis of L5 relative to S1.        IMPRESSION:      1.  Slight increase in density of the large omental deposit in the anterior  pelvis. Slight increase in omental disease along the greater curvature of  the stomach.  A deposit along the right paracolic gutter has resolved.   Additional omental deposits appear relatively similar.  2.  Slight increase in size and density to soft tissue deposit abutting the  bowel in the deep right pelvis.  3.  Dense masslike consolidation in the deep right lung base is relatively  stable in size but more consolidated than seen previously.  4.  Stable small right pleural effusion  5.  Stable sclerosis involving the medial right 11th rib at the  costovertebral junction. New compression deformities at T8 and L5.              ASSESSMENT AND PLAN:  Recurrent endometrial carcinosarcoma  -peritoneal carcinomatosis  -gemcitabine day 1 and day 8 q 21 days. Currently C6 day 1.  She is asking if she can take next week off and do 1 week on and 1 week off, due to hx of holding for counts  -She will return in 2 weeks for cycle 6 (day 8 will actually be day 15).   -CT CAP ordered and will see Dr Giles for results and further treatment plan    Osteoporotic vetebral compression  fractures  -Prolia q 6 months, last on 11/29/22  -Due 5/2023        The indicated understanding of the diagnosis and agreed with the plan of care.  Patient is encouraged to call for any interval symptoms or concerns. Patient verbalizes and is in agreement with recommended plan of care.

## 2023-02-16 ENCOUNTER — OFFICE VISIT (OUTPATIENT)
Dept: NEUROLOGY | Age: 73
End: 2023-02-16
Payer: MEDICARE

## 2023-02-16 VITALS
WEIGHT: 103 LBS | HEIGHT: 62 IN | TEMPERATURE: 98.2 F | OXYGEN SATURATION: 97 % | RESPIRATION RATE: 18 BRPM | BODY MASS INDEX: 18.95 KG/M2 | SYSTOLIC BLOOD PRESSURE: 116 MMHG | DIASTOLIC BLOOD PRESSURE: 76 MMHG | HEART RATE: 76 BPM

## 2023-02-16 DIAGNOSIS — F02.B0 MODERATE ALZHEIMER'S DEMENTIA OF OTHER ONSET WITHOUT BEHAVIORAL DISTURBANCE, PSYCHOTIC DISTURBANCE, MOOD DISTURBANCE, OR ANXIETY (HCC): Primary | ICD-10-CM

## 2023-02-16 DIAGNOSIS — Z98.890 HISTORY OF LEFT-SIDED CAROTID ENDARTERECTOMY: ICD-10-CM

## 2023-02-16 DIAGNOSIS — R41.82 ACUTE ALTERATION IN MENTAL STATUS: ICD-10-CM

## 2023-02-16 DIAGNOSIS — G30.8 MODERATE ALZHEIMER'S DEMENTIA OF OTHER ONSET WITHOUT BEHAVIORAL DISTURBANCE, PSYCHOTIC DISTURBANCE, MOOD DISTURBANCE, OR ANXIETY (HCC): Primary | ICD-10-CM

## 2023-02-16 DIAGNOSIS — I63.312 THROMBOTIC STROKE INVOLVING LEFT MIDDLE CEREBRAL ARTERY (HCC): ICD-10-CM

## 2023-02-16 DIAGNOSIS — I65.23 BILATERAL CAROTID ARTERY STENOSIS: ICD-10-CM

## 2023-02-16 RX ORDER — CLOPIDOGREL BISULFATE 75 MG/1
75 TABLET ORAL DAILY
COMMUNITY
Start: 2022-12-02 | End: 2023-02-16 | Stop reason: ALTCHOICE

## 2023-02-16 RX ORDER — ASPIRIN 81 MG/1
81 TABLET ORAL DAILY
COMMUNITY

## 2023-02-16 NOTE — LETTER
2/16/2023    Patient: Natali Trotter   YOB: 1950   Date of Visit: 2/16/2023     Jared Mcgarry, [address]    Dear Jared Mcgarry MD,      Thank you for referring Ms. Alec Kearney to [address] [place] [address] for evaluation. My notes for this consultation are attached. Consult  REFERRED BY:  Radha Latham MD    CHIEF COMPLAINT: Memory loss      Subjective:     Natali Trotter is a 71 y.o. right-handed female seen as a new patient to me, at the request of Dr. Kenton Rosa of memory loss that the patient has had for about 6 years, for which she had an EEG that was essentially normal, and the MRI scan was also basically normal except for some mild white matter disease. She had neuropsych testing done by a neuropsychologist that showed she had dementia. She is currently on Exelon patch 9.5 mg each day, and takes Namenda 10 mg twice a day in addition. She seems to be slowly progressing. She has had normal RPR and normal thyroid test a normal B12 in the past also. She has high blood pressure and high cholesterol and degenerative arthritis. She may have some sleep apnea symptoms 2. She has zoster and has some pain from that and takes tramadol as needed. 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  We discussed the new medications for Alzheimer's disease in addition, and after discussing it with the  he is a patient nor her  feel she is a candidate. We advised that the best thing she can do is stay mentally and physically active, and exercise about half an hour every day and take multivitamins and vitamin D every day, and eat a Mediterranean type diet. She has headaches and takes Topamax for those at 100 mg a day.     Past Medical History:   Diagnosis Date    Arthritis     KNEE PAIN    Diverticulosis     Hypercholesteremia     Hypertension  Knee pain, chronic 10/14/2022    Thrombotic stroke involving left middle cerebral artery ([facility] [address] 2/16/2023    Unspecified sleep apnea     Vitamin B 12 deficiency     Zoster       Past Surgical History:   Procedure Laterality Date    ENDOSCOPY, COLON, DIAGNOSTIC      16, due 32    HX HYSTERECTOMY      HX ORTHOPAEDIC  '07    RT. SHOULDER    HX ORTHOPAEDIC  6/08    RT. TKR     Family History   Problem Relation Age of Onset    Hypertension Mother     Stroke Mother     Diabetes Father     Hypertension Father     Alcohol abuse Neg Hx     OSTEOARTHRITIS Neg Hx     Asthma Neg Hx     Bleeding Prob Neg Hx     Cancer Neg Hx     Elevated Lipids Neg Hx     Headache Neg Hx     Heart Disease Neg Hx     Lung Disease Neg Hx     Migraines Neg Hx     Psychiatric Disorder Neg Hx     Mental Retardation Neg Hx       Social History     Tobacco Use    Smoking status: Never    Smokeless tobacco: Never   Substance Use Topics    Alcohol use: Not Currently         Current Outpatient Medications:     aspirin delayed-release 81 mg tablet, Take 81 mg by mouth daily. , Disp: , Rfl:     metoprolol tartrate (LOPRESSOR) 100 mg IR tablet, TAKE ONE-HALF (1/2) TABLET TWICE A DAY FOR HIGH BLOOD PRESSURE. (Patient taking differently: Take 100 mg by mouth daily.), Disp: 90 Tablet, Rfl: 3    potassium chloride (Klor-Con M20) 20 mEq tablet, TAKE 1 TABLET DAILY, Disp: 90 Tablet, Rfl: 1    simvastatin (ZOCOR) 20 mg tablet, TAKE 1 TABLET NIGHTLY, Disp: 90 Tablet, Rfl: 3    allopurinoL (ZYLOPRIM) 100 mg tablet, TAKE 1 TABLET DAILY, Disp: 90 Tablet, Rfl: 3    losartan (COZAAR) 50 mg tablet, TAKE 1/2  TABLET DAILY - new directions, Disp: 90 Tablet, Rfl: 1    hydroCHLOROthiazide (HYDRODIURIL) 25 mg tablet, Take 0.5 Tablets by mouth daily. , Disp: 45 Tablet, Rfl: 1    topiramate (TOPAMAX) 50 mg tablet, Take 100 mg by mouth nightly., Disp: , Rfl:     sertraline (ZOLOFT) 50 mg tablet, Take 1 Tablet by mouth daily.  Per neuro (Patient taking differently: Take 100 mg by mouth daily. Per neuro), Disp: 1 Tablet, Rfl: 0    cyanocobalamin 1,000 mcg tablet, Take 1,000 mcg by mouth daily. , Disp: , Rfl:     memantine (NAMENDA) 10 mg tablet, Take  by mouth two (2) times a day., Disp: , Rfl:     rivastigmine (EXELON) 9.5 mg/24 hr patch, 1 Patch by TransDERmal route daily. Per neurology, Disp: 1 Patch, Rfl: 0    DOCOSAHEXANOIC ACID/EPA (FISH OIL PO), Take  by mouth daily. , Disp: , Rfl:     cholecalciferol (VITAMIN D3) (1000 Units /25 mcg) tablet, Take  by mouth daily. , Disp: , Rfl:     garlic 1,645 mg Cap, Take 1 Cap by mouth daily. , Disp: , Rfl:         Allergies   Allergen Reactions    Latex, Natural Rubber Rash    Adhesive Tape Rash    Motrin [Ibuprofen] Other (comments)     Joint stiffness    Percocet [Oxycodone-Acetaminophen] Hives      MRI Results (most recent):  Results from [place] encounter on 11/21/11    MRI BRAIN W AND W/O CONTRAST    Narrative  **Final Report**      ICD Codes / Adm. Diagnosis: 780.39  784.0 / OTHER CONVULSIONS  Examination:  MR BRAIN W AND WO CON  - [id number] - Nov 21 2011  1:13PM  Accession No:  [id number]  Reason:  cont cognitive decline      REPORT:  INDICATION: memory loss for 6 months. COMPARISON: CT head on 01/17/2011    TECHNIQUE: Sagittal T1; coronal post contrast T1; axial T1, T2, FLAIR,  gradient-echo, diffusion weighted MRI of the brain before and following  uneventful intravenous administration of gadolinium 17 mL Magnevist    FINDINGS: Subtle volume loss of the hippocampi compared to the temporal lobe  gyri. No diffuse volume loss. No hydrocephalus. No mass effect or midline  shift. No extra-axial fluid collection. There are a few scattered foci of  hyperintense T2/FLAIR signal in the cerebral white matter. No restricted  diffusion to indicate acute infarct. No pathologic enhancement. The major intracranial vascular flow-voids are  patent.  The midline structures, including the cervicomedullary junction, are  within normal limits. IMPRESSION:    1. Minimal chronic microvascular ischemic disease. 2. No acute infarct. 3. No pathologically enhancing mass. 4. Subtle volume loss of the hippocampi. Signing/Reading Doctor: Praful Montague ([id number])  Approved: Praful Montague ([id number])  11/21/2011      Results from Hospital Encounter encounter on 11/21/11    MRI BRAIN W AND W/O CONTRAST    Narrative  **Final Report**      ICD Codes / Adm. Diagnosis: 780.39  784.0 / OTHER CONVULSIONS  Examination:  MR BRAIN W AND WO CON  - [id number] - Nov 21 2011  1:13PM  Accession No:  [id number]  Reason:  cont cognitive decline      REPORT:  INDICATION: memory loss for 6 months. COMPARISON: CT head on 01/17/2011    TECHNIQUE: Sagittal T1; coronal post contrast T1; axial T1, T2, FLAIR,  gradient-echo, diffusion weighted MRI of the brain before and following  uneventful intravenous administration of gadolinium 17 mL Magnevist    FINDINGS: Subtle volume loss of the hippocampi compared to the temporal lobe  gyri. No diffuse volume loss. No hydrocephalus. No mass effect or midline  shift. No extra-axial fluid collection. There are a few scattered foci of  hyperintense T2/FLAIR signal in the cerebral white matter. No restricted  diffusion to indicate acute infarct. No pathologic enhancement. The major intracranial vascular flow-voids are  patent. The midline structures, including the cervicomedullary junction, are  within normal limits. IMPRESSION:    1. Minimal chronic microvascular ischemic disease. 2. No acute infarct. 3. No pathologically enhancing mass. 4. Subtle volume loss of the hippocampi. Signing/Reading Doctor: Praful Montague ([id number])  Approved: Praful Montague ([id number])  11/21/2011    Review of Systems:  Review of systems not obtained due to patient factors.    Vitals:    02/16/23 1339   BP: 116/76   Pulse: 76   Resp: 18   Temp: 98.2 °F (36.8 °C)   SpO2: 97%   Weight: 103 lb (46.7 kg) Height: 5' 2\" (1.575 m)     Objective:     I      NEUROLOGICAL EXAM:    Appearance: The patient is poorly developed, well nourished, provides a poor history and is in no acute distress. Mental Status: Oriented to place and person, and not the date or the president, cognitive function is abnormal and speech is fluent and no aphasia or dysarthria. Mood and affect appropriate. Cranial Nerves:   Intact visual fields. Fundi are benign, disc are flat, no lesions seen on funduscopy. SHAUNA, EOM's full, no nystagmus, no ptosis. Facial sensation is normal. Corneal reflexes are not tested. Facial movement is symmetric. Hearing is normal bilaterally. Palate is midline with normal sternocleidomastoid and trapezius muscles are normal. Tongue is midline. Neck without meningismus or bruits  Temporal arteries are not tender or enlarged  TMJ areas are not tender on palpation   Motor:  4/5 strength in upper and lower proximal and distal muscles. Normal bulk and tone. No fasciculations. Rapid alternating movement is symmetric and intact bilaterally   Reflexes:   Deep tendon reflexes 1+/4 and symmetrical.  No babinski or clonus present   Sensory:   Normal to touch, pinprick and vibration and temperature. DSS is intact   Gait:  Abnormal gait for patient's age as she moves slowly with slightly wide-based gait due to arthritis. .   Tremor:   No tremor noted. Cerebellar:  Mildly abnormal cerebellar signs present on Romberg and tandem testing and finger-nose-finger exam.   Neurovascular:  Normal heart sounds and regular rhythm, peripheral pulses decreased, and no carotid bruits. Assessment:       ICD-10-CM ICD-9-CM    1. Moderate Alzheimer's dementia of other onset without behavioral disturbance, psychotic disturbance, mood disturbance, or anxiety ([facility] [address]  G30.8 331.0     F02. B0 294.10       2. Bilateral carotid artery stenosis  I65.23 433.10      433.30       3.  History of left-sided carotid endarterectomy  Z98.890 V45.89 4. Thrombotic stroke involving left middle cerebral artery ([facility] [address]  I63.312 434.01       5. Acute alteration in mental status  R41.82 780.97         Active Problems:    * No active hospital problems. *      Plan:     Patient with clear amnestic dementia, on maximum medical therapy with Exelon patch 9.5 mg and 10 mg of Namenda twice a day, we discussed in detail the new medications monoclonal antibody coming down with the , and he does not want her to be a candidate for that after the risk and benefits explained that him. We encouraged her to stay mentally and physically active, exercise half an hour every day, and eat a Mediterranean type diet. Patient has moderate severe dementia and can barely speak in complete sentences. We discussed her prognosis and treatment options with the  and new medications coming, and time for the patient was 36 minutes reviewing all her old records, neurologic notes, and discussing possible therapy with the . Signed By: Umair Flores MD     February 16, 2023       CC: Ethan Mcnamara MD  FAX: [phone]        If you have questions, please do not hesitate to call me. I look forward to following your patient along with you.       Sincerely,    Umair Flores MD

## 2023-02-17 NOTE — PROGRESS NOTES
Consult  REFERRED BY:  Reji Moya MD    CHIEF COMPLAINT: Memory loss      Subjective:     Mary Amaro is a 71 y.o. right-handed female seen as a new patient to me, at the request of Dr. Arturo Del Cid of memory loss that the patient has had for about 6 years, for which she had an EEG that was essentially normal, and the MRI scan was also basically normal except for some mild white matter disease. She had neuropsych testing done by a neuropsychologist that showed she had dementia. She is currently on Exelon patch 9.5 mg each day, and takes Namenda 10 mg twice a day in addition. She seems to be slowly progressing. She has had normal RPR and normal thyroid test a normal B12 in the past also. She has high blood pressure and high cholesterol and degenerative arthritis. She may have some sleep apnea symptoms 2. She has zoster and has some pain from that and takes tramadol as needed. 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  We discussed the new medications for Alzheimer's disease in addition, and after discussing it with the  he is a patient nor her  feel she is a candidate. We advised that the best thing she can do is stay mentally and physically active, and exercise about half an hour every day and take multivitamins and vitamin D every day, and eat a Mediterranean type diet. She has headaches and takes Topamax for those at 100 mg a day. Past Medical History:   Diagnosis Date    Arthritis     KNEE PAIN    Diverticulosis     Hypercholesteremia     Hypertension     Knee pain, chronic 10/14/2022    Thrombotic stroke involving left middle cerebral artery ([facility] [address] 2/16/2023    Unspecified sleep apnea     Vitamin B 12 deficiency     Zoster       Past Surgical History:   Procedure Laterality Date    ENDOSCOPY, COLON, DIAGNOSTIC      16, due 26    HX HYSTERECTOMY      HX ORTHOPAEDIC  '07    RT. SHOULDER    HX ORTHOPAEDIC  6/08    RT. TKR     Family History   Problem Relation Age of Onset    Hypertension Mother     Stroke Mother     Diabetes Father     Hypertension Father     Alcohol abuse Neg Hx     OSTEOARTHRITIS Neg Hx     Asthma Neg Hx     Bleeding Prob Neg Hx     Cancer Neg Hx     Elevated Lipids Neg Hx     Headache Neg Hx     Heart Disease Neg Hx     Lung Disease Neg Hx     Migraines Neg Hx     Psychiatric Disorder Neg Hx     Mental Retardation Neg Hx       Social History     Tobacco Use    Smoking status: Never    Smokeless tobacco: Never   Substance Use Topics    Alcohol use: Not Currently         Current Outpatient Medications:     aspirin delayed-release 81 mg tablet, Take 81 mg by mouth daily. , Disp: , Rfl:     metoprolol tartrate (LOPRESSOR) 100 mg IR tablet, TAKE ONE-HALF (1/2) TABLET TWICE A DAY FOR HIGH BLOOD PRESSURE. (Patient taking differently: Take 100 mg by mouth daily.), Disp: 90 Tablet, Rfl: 3    potassium chloride (Klor-Con M20) 20 mEq tablet, TAKE 1 TABLET DAILY, Disp: 90 Tablet, Rfl: 1    simvastatin (ZOCOR) 20 mg tablet, TAKE 1 TABLET NIGHTLY, Disp: 90 Tablet, Rfl: 3    allopurinoL (ZYLOPRIM) 100 mg tablet, TAKE 1 TABLET DAILY, Disp: 90 Tablet, Rfl: 3    losartan (COZAAR) 50 mg tablet, TAKE 1/2  TABLET DAILY - new directions, Disp: 90 Tablet, Rfl: 1    hydroCHLOROthiazide (HYDRODIURIL) 25 mg tablet, Take 0.5 Tablets by mouth daily. , Disp: 45 Tablet, Rfl: 1    topiramate (TOPAMAX) 50 mg tablet, Take 100 mg by mouth nightly., Disp: , Rfl:     sertraline (ZOLOFT) 50 mg tablet, Take 1 Tablet by mouth daily. Per neuro (Patient taking differently: Take 100 mg by mouth daily. Per neuro), Disp: 1 Tablet, Rfl: 0    cyanocobalamin 1,000 mcg tablet, Take 1,000 mcg by mouth daily. , Disp: , Rfl:     memantine (NAMENDA) 10 mg tablet, Take  by mouth two (2) times a day., Disp: , Rfl:     rivastigmine (EXELON) 9.5 mg/24 hr patch, 1 Patch by TransDERmal route daily.  Per neurology, Disp: 1 Patch, Rfl: 0    DOCOSAHEXANOIC ACID/EPA (FISH OIL PO), Take  by mouth daily. , Disp: , Rfl:     cholecalciferol (VITAMIN D3) (1000 Units /25 mcg) tablet, Take  by mouth daily. , Disp: , Rfl:     garlic 8,350 mg Cap, Take 1 Cap by mouth daily. , Disp: , Rfl:         Allergies   Allergen Reactions    Latex, Natural Rubber Rash    Adhesive Tape Rash    Motrin [Ibuprofen] Other (comments)     Joint stiffness    Percocet [Oxycodone-Acetaminophen] Hives      MRI Results (most recent):  Results from [place] encounter on 11/21/11    MRI BRAIN W AND W/O CONTRAST    Narrative  **Final Report**      ICD Codes / Adm. Diagnosis: 780.39  784.0 / OTHER CONVULSIONS  Examination:  MR BRAIN W AND WO CON  - [id number] - Nov 21 2011  1:13PM  Accession No:  [id number]  Reason:  cont cognitive decline      REPORT:  INDICATION: memory loss for 6 months. COMPARISON: CT head on 01/17/2011    TECHNIQUE: Sagittal T1; coronal post contrast T1; axial T1, T2, FLAIR,  gradient-echo, diffusion weighted MRI of the brain before and following  uneventful intravenous administration of gadolinium 17 mL Magnevist    FINDINGS: Subtle volume loss of the hippocampi compared to the temporal lobe  gyri. No diffuse volume loss. No hydrocephalus. No mass effect or midline  shift. No extra-axial fluid collection. There are a few scattered foci of  hyperintense T2/FLAIR signal in the cerebral white matter. No restricted  diffusion to indicate acute infarct. No pathologic enhancement. The major intracranial vascular flow-voids are  patent. The midline structures, including the cervicomedullary junction, are  within normal limits. IMPRESSION:    1. Minimal chronic microvascular ischemic disease. 2. No acute infarct. 3. No pathologically enhancing mass. 4. Subtle volume loss of the hippocampi.         Signing/Reading Doctor: Brynn Jones ([id number])  Approved: Brynn Jones ([id number])  11/21/2011      Results from Hospital Encounter encounter on 11/21/11    MRI BRAIN W AND W/O CONTRAST    Narrative  **Final Report**      ICD Codes / Adm. Diagnosis: 780.39  784.0 / OTHER CONVULSIONS  Examination:  MR BRAIN W NILTON BARAJAS CON  - [id number] - Nov 21 2011  1:13PM  Accession No:  [id number]  Reason:  cont cognitive decline      REPORT:  INDICATION: memory loss for 6 months. COMPARISON: CT head on 01/17/2011    TECHNIQUE: Sagittal T1; coronal post contrast T1; axial T1, T2, FLAIR,  gradient-echo, diffusion weighted MRI of the brain before and following  uneventful intravenous administration of gadolinium 17 mL Magnevist    FINDINGS: Subtle volume loss of the hippocampi compared to the temporal lobe  gyri. No diffuse volume loss. No hydrocephalus. No mass effect or midline  shift. No extra-axial fluid collection. There are a few scattered foci of  hyperintense T2/FLAIR signal in the cerebral white matter. No restricted  diffusion to indicate acute infarct. No pathologic enhancement. The major intracranial vascular flow-voids are  patent. The midline structures, including the cervicomedullary junction, are  within normal limits. IMPRESSION:    1. Minimal chronic microvascular ischemic disease. 2. No acute infarct. 3. No pathologically enhancing mass. 4. Subtle volume loss of the hippocampi. Signing/Reading Doctor: Aida Majano ([id number])  Approved: Aida Majano ([id number])  11/21/2011    Review of Systems:  Review of systems not obtained due to patient factors. Vitals:    02/16/23 1339   BP: 116/76   Pulse: 76   Resp: 18   Temp: 98.2 °F (36.8 °C)   SpO2: 97%   Weight: 103 lb (46.7 kg)   Height: 5' 2\" (1.575 m)     Objective:     I      NEUROLOGICAL EXAM:    Appearance: The patient is poorly developed, well nourished, provides a poor history and is in no acute distress. Mental Status: Oriented to place and person, and not the date or the president, cognitive function is abnormal and speech is fluent and no aphasia or dysarthria. Mood and affect appropriate. Cranial Nerves:   Intact visual fields.  Fundi are benign, disc are flat, no lesions seen on funduscopy. SHAUNA, EOM's full, no nystagmus, no ptosis. Facial sensation is normal. Corneal reflexes are not tested. Facial movement is symmetric. Hearing is normal bilaterally. Palate is midline with normal sternocleidomastoid and trapezius muscles are normal. Tongue is midline. Neck without meningismus or bruits  Temporal arteries are not tender or enlarged  TMJ areas are not tender on palpation   Motor:  4/5 strength in upper and lower proximal and distal muscles. Normal bulk and tone. No fasciculations. Rapid alternating movement is symmetric and intact bilaterally   Reflexes:   Deep tendon reflexes 1+/4 and symmetrical.  No babinski or clonus present   Sensory:   Normal to touch, pinprick and vibration and temperature. DSS is intact   Gait:  Abnormal gait for patient's age as she moves slowly with slightly wide-based gait due to arthritis. .   Tremor:   No tremor noted. Cerebellar:  Mildly abnormal cerebellar signs present on Romberg and tandem testing and finger-nose-finger exam.   Neurovascular:  Normal heart sounds and regular rhythm, peripheral pulses decreased, and no carotid bruits. Assessment:       ICD-10-CM ICD-9-CM    1. Moderate Alzheimer's dementia of other onset without behavioral disturbance, psychotic disturbance, mood disturbance, or anxiety ([facility] [address]  G30.8 331.0     F02. B0 294.10       2. Bilateral carotid artery stenosis  I65.23 433.10      433.30       3. History of left-sided carotid endarterectomy  Z98.890 V45.89       4. Thrombotic stroke involving left middle cerebral artery ([facility] [address]  I63.312 434.01       5. Acute alteration in mental status  R41.82 780.97         Active Problems:    * No active hospital problems.  *      Plan:     Patient with clear amnestic dementia, on maximum medical therapy with Exelon patch 9.5 mg and 10 mg of Namenda twice a day, we discussed in detail the new medications monoclonal antibody coming down with the , and he does not want her to be a candidate for that after the risk and benefits explained that him. We encouraged her to stay mentally and physically active, exercise half an hour every day, and eat a Mediterranean type diet. Patient has moderate severe dementia and can barely speak in complete sentences. We discussed her prognosis and treatment options with the  and new medications coming, and time for the patient was 36 minutes reviewing all her old records, neurologic notes, and discussing possible therapy with the . Signed By: Natividad Phan MD     February 16, 2023       CC:  Nahun Montemayor MD  FAX: [phone]

## 2023-03-27 ENCOUNTER — TELEPHONE (OUTPATIENT)
Dept: INTERNAL MEDICINE CLINIC | Age: 73
End: 2023-03-27

## 2023-03-27 NOTE — TELEPHONE ENCOUNTER
Would need more information, I havent seen patient since October, not sure I assessed her for whatever those home needs were

## 2023-03-27 NOTE — TELEPHONE ENCOUNTER
Reason for call:  needs to know if [facility] will sign for home care dates 11/11/22-01/09/23.   Please call and advise    Is this a new problem: yes     Date of last appointment:  10/13/2022     Can we respond via [employer]: no    Best call back number:    [facility] [phone]

## 2023-03-30 ENCOUNTER — TELEPHONE (OUTPATIENT)
Dept: INTERNAL MEDICINE CLINIC | Age: 73
End: 2023-03-30

## 2023-04-04 ENCOUNTER — TELEPHONE (OUTPATIENT)
Dept: INTERNAL MEDICINE CLINIC | Age: 73
End: 2023-04-04

## 2023-04-04 NOTE — TELEPHONE ENCOUNTER
Reason for call:   Pt fell over the weekend and  has no injury but her  said she does not want to take her meds in the last week.      Is this a new problem: yes     Date of last appointment:  10/13/2022     Can we respond via [employer]: no    Best call back number: Liu Guardado   [phone] with [facility]

## 2023-05-25 ENCOUNTER — TELEPHONE (OUTPATIENT)
Age: 73
End: 2023-05-25

## 2023-05-25 NOTE — TELEPHONE ENCOUNTER
Reason for call:  dsching home health and pt wt 88lbs, not taking medications regularly and is not eating consistently.  has been contacted.     Is this a new problem: Yes    Date of last appointment:  10/13/2022     Can we respond via [employer]: No    Best call back number:   [facility] [phone]

## 2023-05-25 NOTE — TELEPHONE ENCOUNTER
Would you be able to check in with her ? I thought I read somewhere that they were moving her into a nursing home or memory care unit but maybe it didn't happen. I would suggest they transition to a home based primary care in [facility] or hospice care at this time as I dont think he's going to be able to get her here regularly for me to manage her care.

## 2023-05-30 RX ORDER — POTASSIUM CHLORIDE 20 MEQ/1
TABLET, EXTENDED RELEASE ORAL
Qty: 90 TABLET | Refills: 0 | Status: SHIPPED | OUTPATIENT
Start: 2023-05-30

## 2023-06-23 DIAGNOSIS — I10 ESSENTIAL (PRIMARY) HYPERTENSION: ICD-10-CM

## 2023-06-23 RX ORDER — HYDROCHLOROTHIAZIDE 25 MG/1
TABLET ORAL
Qty: 45 TABLET | OUTPATIENT
Start: 2023-06-23

## 2023-07-07 ENCOUNTER — OFFICE VISIT (OUTPATIENT)
Age: 73
End: 2023-07-07
Payer: MEDICARE

## 2023-07-07 VITALS
TEMPERATURE: 97.1 F | OXYGEN SATURATION: 100 % | HEART RATE: 63 BPM | DIASTOLIC BLOOD PRESSURE: 83 MMHG | SYSTOLIC BLOOD PRESSURE: 137 MMHG | RESPIRATION RATE: 18 BRPM | HEIGHT: 62 IN | WEIGHT: 93 LBS | BODY MASS INDEX: 17.11 KG/M2

## 2023-07-07 DIAGNOSIS — G30.1 MODERATE LATE ONSET ALZHEIMER'S DEMENTIA WITH AGITATION (HCC): ICD-10-CM

## 2023-07-07 DIAGNOSIS — I63.312 THROMBOTIC STROKE INVOLVING LEFT MIDDLE CEREBRAL ARTERY (HCC): ICD-10-CM

## 2023-07-07 DIAGNOSIS — F02.B11 MODERATE LATE ONSET ALZHEIMER'S DEMENTIA WITH AGITATION (HCC): ICD-10-CM

## 2023-07-07 DIAGNOSIS — I10 PRIMARY HYPERTENSION: ICD-10-CM

## 2023-07-07 DIAGNOSIS — N18.31 STAGE 3A CHRONIC KIDNEY DISEASE (HCC): Primary | ICD-10-CM

## 2023-07-07 DIAGNOSIS — R62.7 FAILURE TO THRIVE IN ADULT: ICD-10-CM

## 2023-07-07 PROBLEM — R41.82 ACUTE ALTERATION IN MENTAL STATUS: Status: RESOLVED | Noted: 2023-02-16 | Resolved: 2023-07-07

## 2023-07-07 PROBLEM — I65.23 BILATERAL CAROTID ARTERY OCCLUSION: Status: RESOLVED | Noted: 2023-02-16 | Resolved: 2023-07-07

## 2023-07-07 PROCEDURE — G8399 PT W/DXA RESULTS DOCUMENT: HCPCS | Performed by: INTERNAL MEDICINE

## 2023-07-07 PROCEDURE — G8419 CALC BMI OUT NRM PARAM NOF/U: HCPCS | Performed by: INTERNAL MEDICINE

## 2023-07-07 PROCEDURE — 3075F SYST BP GE 130 - 139MM HG: CPT | Performed by: INTERNAL MEDICINE

## 2023-07-07 PROCEDURE — 99214 OFFICE O/P EST MOD 30 MIN: CPT | Performed by: INTERNAL MEDICINE

## 2023-07-07 PROCEDURE — 1123F ACP DISCUSS/DSCN MKR DOCD: CPT | Performed by: INTERNAL MEDICINE

## 2023-07-07 PROCEDURE — 3079F DIAST BP 80-89 MM HG: CPT | Performed by: INTERNAL MEDICINE

## 2023-07-07 PROCEDURE — 1090F PRES/ABSN URINE INCON ASSESS: CPT | Performed by: INTERNAL MEDICINE

## 2023-07-07 PROCEDURE — 3017F COLORECTAL CA SCREEN DOC REV: CPT | Performed by: INTERNAL MEDICINE

## 2023-07-07 PROCEDURE — G8427 DOCREV CUR MEDS BY ELIG CLIN: HCPCS | Performed by: INTERNAL MEDICINE

## 2023-07-07 PROCEDURE — 1036F TOBACCO NON-USER: CPT | Performed by: INTERNAL MEDICINE

## 2023-07-07 RX ORDER — CLOPIDOGREL BISULFATE 75 MG/1
TABLET ORAL DAILY
COMMUNITY
Start: 2022-12-20

## 2023-07-07 RX ORDER — RIVASTIGMINE 9.5 MG/24H
1 PATCH, EXTENDED RELEASE TRANSDERMAL DAILY
Qty: 90 PATCH | Refills: 1 | Status: SHIPPED | OUTPATIENT
Start: 2023-07-07

## 2023-07-07 RX ORDER — HYDROCHLOROTHIAZIDE 12.5 MG/1
12.5 TABLET ORAL DAILY
Qty: 90 TABLET | Refills: 1 | Status: SHIPPED | OUTPATIENT
Start: 2023-07-07

## 2023-07-07 NOTE — ASSESSMENT & PLAN NOTE
Monitored by specialist- no acute findings meriting change in the plan   Now seeing Dr Meera Jewell  She has been off topiramate recent which was originally for headaches, OK to keep off to simplify med regimen  Nurse navigator helping assist with home services/ possible transition to home based primary care

## 2023-07-07 NOTE — ASSESSMENT & PLAN NOTE
Stable on most recent check, no electrolyte issues  Lab Results   Component Value Date     06/01/2023    K 3.7 06/01/2023     (H) 06/01/2023    CO2 27 06/01/2023    BUN 38 (H) 06/01/2023    CREATININE 1.50 (H) 06/01/2023    GLUCOSE 95 06/01/2023    CALCIUM 9.8 06/01/2023    PROT 6.5 06/01/2023    LABALBU 3.9 06/01/2023    BILITOT 0.3 06/01/2023    ALKPHOS 48 06/01/2023    AST 17 06/01/2023    ALT 18 06/01/2023    LABGLOM 37 (L) 06/01/2023    GFRAA 39 (L) 10/27/2021    AGRATIO 2.6 (H) 04/19/2022    GLOB 2.6 06/01/2023

## 2023-07-10 ENCOUNTER — CLINICAL DOCUMENTATION (OUTPATIENT)
Dept: SPIRITUAL SERVICES | Age: 73
End: 2023-07-10

## 2023-07-10 NOTE — ACP (ADVANCE CARE PLANNING)
Advance Care Planning   Ambulatory ACP Specialist Patient Outreach    Date:  7/10/2023    ACP Specialist:  Vinay Velasco    Outreach call to patient in follow-up to ACP Specialist referral from:Latonya Marrero MD    [x] PCP  [] Provider   [] Ambulatory Care Management [] Other     For:                  [x] Advance Directive Assistance              [] Complete Portable DNR order              [] Complete POST/POLST/MOST              [] Code Status Discussion             [x] Discuss Goals of Care             [] Early ACP Decision-Making              [] Other (Specify)    Date Referral Received: 7/7/2023    Next Step:   [] ACP scheduled conversation  [] Outreach again in one week               [] Email / Mail [address]  [] Email / Mail Advance Directive   [x] Closing referral.  Routing closure to referring provider/staff and to ACP Specialist . [] Closure letter mailed to patient with invitation to contact ACP Specialist if / when ready. [] At this time, Healthcare Decision Maker Is:      Primary Decision Maker: Ami Stauffer - Spouse - [phone]    [] Agent named in scanned advance directive: Name:                         [] Legal Next of Kin: Name:                        [] Unable to determine legal decision maker at this time. [] Other (Specify here): Outreaches:       [x] 1st -  Date:  7/10/2023               Intervention:  [] Spoke with Patient   [] Left Voice mail [] Email / Mail    [] [employer]  [x] Other (Specify) : Care Coordination team to address ACP. Outcomes: As writer was preparing to make ACP outreach, it was noticed that Care Coordination was actively following patient. Writer notified ACP  who contacted Care Coordination team.  Care Coordination team agreed to address ACP needs with patient and spouse.         Thank you for this referral.

## 2023-08-09 ENCOUNTER — TELEPHONE (OUTPATIENT)
Age: 73
End: 2023-08-09

## 2023-08-09 NOTE — TELEPHONE ENCOUNTER
Informed Pietro Corea of [facility] Dr Emily Oneal will b out of office for the week. And message forwarded for her review.

## 2023-08-09 NOTE — TELEPHONE ENCOUNTER
Fadi from [facility] called to see if Dr. Shahriar Naqvi received patient's home health orders that he had faxed to us.     Latia Bills - [phone]

## 2023-08-28 DIAGNOSIS — G30.1 MODERATE LATE ONSET ALZHEIMER'S DEMENTIA WITH AGITATION (HCC): ICD-10-CM

## 2023-08-28 DIAGNOSIS — F02.B11 MODERATE LATE ONSET ALZHEIMER'S DEMENTIA WITH AGITATION (HCC): ICD-10-CM

## 2023-08-28 RX ORDER — RIVASTIGMINE 9.5 MG/24H
1 PATCH, EXTENDED RELEASE TRANSDERMAL DAILY
Qty: 90 PATCH | Refills: 1 | Status: SHIPPED | OUTPATIENT
Start: 2023-08-28

## 2023-08-31 RX ORDER — SIMVASTATIN 20 MG
TABLET ORAL
Qty: 90 TABLET | Refills: 3 | Status: SHIPPED | OUTPATIENT
Start: 2023-08-31

## 2023-08-31 RX ORDER — ALLOPURINOL 100 MG/1
TABLET ORAL
Qty: 90 TABLET | Refills: 3 | Status: SHIPPED | OUTPATIENT
Start: 2023-08-31

## 2023-11-20 ENCOUNTER — TELEPHONE (OUTPATIENT)
Age: 73
End: 2023-11-20

## 2023-11-20 NOTE — TELEPHONE ENCOUNTER
We got faxed some paperwork from [facility] for her needing medical eval prior to admission. I thought she had transferred to a local provider so they would be better to fill it but if they want me to she will need to come for appt.

## 2023-12-27 NOTE — TELEPHONE ENCOUNTER
Pt spouse states pt has dementia and is in a facility.  He does not know if she is going to remain a pt here or not.  He cannot schedule her an appt at this time.

## 2024-01-02 RX ORDER — LOSARTAN POTASSIUM 50 MG/1
TABLET ORAL
Qty: 45 TABLET | Refills: 0 | Status: SHIPPED | OUTPATIENT
Start: 2024-01-02

## 2024-03-18 RX ORDER — LOSARTAN POTASSIUM 50 MG/1
TABLET ORAL
Qty: 45 TABLET | Refills: 0 | Status: SHIPPED | OUTPATIENT
Start: 2024-03-18

## 2024-05-31 RX ORDER — LOSARTAN POTASSIUM 50 MG/1
25 TABLET ORAL DAILY
Qty: 45 TABLET | Refills: 3 | OUTPATIENT
Start: 2024-05-31

## 2024-05-31 RX ORDER — METOPROLOL TARTRATE 100 MG/1
TABLET ORAL
Qty: 90 TABLET | Refills: 3 | OUTPATIENT
Start: 2024-05-31

## 2024-05-31 RX ORDER — POTASSIUM CHLORIDE 20 MEQ/1
TABLET, EXTENDED RELEASE ORAL
Qty: 90 TABLET | Refills: 3 | OUTPATIENT
Start: 2024-05-31

## 2024-10-28 RX ORDER — ALLOPURINOL 100 MG/1
TABLET ORAL
Qty: 90 TABLET | Refills: 3 | OUTPATIENT
Start: 2024-10-28

## 2024-10-28 RX ORDER — SIMVASTATIN 20 MG
TABLET ORAL
Qty: 90 TABLET | Refills: 3 | OUTPATIENT
Start: 2024-10-28